# Patient Record
Sex: FEMALE | Race: WHITE | NOT HISPANIC OR LATINO | Employment: FULL TIME | URBAN - METROPOLITAN AREA
[De-identification: names, ages, dates, MRNs, and addresses within clinical notes are randomized per-mention and may not be internally consistent; named-entity substitution may affect disease eponyms.]

---
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
## 2018-12-22 ENCOUNTER — HOSPITAL ENCOUNTER (OUTPATIENT)
Dept: PREADMISSION TESTING | Facility: HOSPITAL | Age: 18
Discharge: HOME | End: 2018-12-22
Attending: PODIATRIST | Admitting: PODIATRIST

## 2018-12-22 LAB
ANION GAP SERPL CALC-SCNC: 15.1 MMOL/L (ref 10–20)
BASOPHILS # BLD AUTO: 0.1 10*3/UL (ref 0–0.2)
BASOPHILS NFR BLD AUTO: 1 % (ref 0–2)
BUN SERPL-MCNC: 12 MG/DL (ref 8–20)
BUN/CREAT SERPL: 15 (ref 5.4–26.2)
CALCIUM SERPL-MCNC: 9.5 MG/DL (ref 8.9–10.3)
CHLORIDE SERPL-SCNC: 103 MMOL/L (ref 101–111)
CONV CO2: 23 MMOL/L (ref 22–32)
CREAT UR-MCNC: 0.8 MG/DL (ref 0.4–1)
DIFFERENTIAL METHOD BLD: (no result)
EOSINOPHIL # BLD AUTO: 1.2 10*3/UL (ref 0–0.3)
EOSINOPHIL # BLD AUTO: 12 % (ref 0–3)
ERYTHROCYTE [DISTWIDTH] IN BLOOD BY AUTOMATED COUNT: 12.4 % (ref 11.5–14.5)
GLUCOSE SERPL-MCNC: 94 MG/DL (ref 65–99)
HCT VFR BLD AUTO: 41.2 % (ref 35–49)
HGB BLD-MCNC: 13.8 G/DL (ref 12–15)
INR PPP: 1
LYMPHOCYTES # BLD AUTO: 2.8 10*3/UL (ref 0.8–4.8)
LYMPHOCYTES NFR BLD AUTO: 27 % (ref 18–42)
MCH RBC QN AUTO: 29.9 PG (ref 26–32)
MCHC RBC AUTO-ENTMCNC: 33.6 G/DL (ref 32–36)
MCV RBC AUTO: 89.1 FL (ref 80–94)
MONOCYTES # BLD AUTO: 0.6 10*3/UL (ref 0.1–1.3)
MONOCYTES NFR BLD AUTO: 5 % (ref 2–11)
NEUTROPHILS # BLD AUTO: 5.6 10*3/UL (ref 2.3–8.6)
NEUTROPHILS NFR BLD AUTO: 55 % (ref 50–75)
NRBC BLD AUTO-RTO: 0 /100{WBCS}
NRBC/RBC NFR BLD MANUAL: 0 10*3/UL
PLATELET # BLD AUTO: 391 10*3/UL (ref 150–450)
PMV BLD AUTO: 7.7 FL (ref 7.4–10.4)
POTASSIUM SERPL-SCNC: 4.1 MMOL/L (ref 3.6–5.1)
PROTHROMBIN TIME: 10.7 SEC (ref 9.6–11.7)
RBC # BLD AUTO: 4.63 10*6/UL (ref 4–5.4)
SODIUM SERPL-SCNC: 137 MMOL/L (ref 136–144)
WBC # BLD AUTO: 10.3 10*3/UL (ref 4.5–11.5)

## 2018-12-24 LAB — 25(OH)D3 SERPL-MCNC: 14 NG/ML (ref 30–100)

## 2019-06-04 VITALS
SYSTOLIC BLOOD PRESSURE: 108 MMHG | DIASTOLIC BLOOD PRESSURE: 68 MMHG | OXYGEN SATURATION: 94 % | DIASTOLIC BLOOD PRESSURE: 73 MMHG | HEART RATE: 72 BPM | RESPIRATION RATE: 16 BRPM | HEART RATE: 122 BPM | RESPIRATION RATE: 24 BRPM | OXYGEN SATURATION: 99 % | WEIGHT: 174.5 LBS | WEIGHT: 169 LBS | SYSTOLIC BLOOD PRESSURE: 116 MMHG

## 2019-09-24 ENCOUNTER — LAB (OUTPATIENT)
Dept: FAMILY MEDICINE CLINIC | Facility: CLINIC | Age: 19
End: 2019-09-24

## 2019-09-24 ENCOUNTER — OFFICE VISIT (OUTPATIENT)
Dept: FAMILY MEDICINE CLINIC | Facility: CLINIC | Age: 19
End: 2019-09-24

## 2019-09-24 VITALS
BODY MASS INDEX: 36.07 KG/M2 | OXYGEN SATURATION: 97 % | TEMPERATURE: 98.5 F | RESPIRATION RATE: 18 BRPM | WEIGHT: 203.6 LBS | HEIGHT: 63 IN | SYSTOLIC BLOOD PRESSURE: 114 MMHG | DIASTOLIC BLOOD PRESSURE: 70 MMHG | HEART RATE: 56 BPM

## 2019-09-24 DIAGNOSIS — E55.9 VITAMIN D DEFICIENCY DISEASE: ICD-10-CM

## 2019-09-24 DIAGNOSIS — R53.83 OTHER FATIGUE: Primary | ICD-10-CM

## 2019-09-24 DIAGNOSIS — Z30.09 ENCOUNTER FOR EDUCATION ABOUT CONTRACEPTIVE USE: ICD-10-CM

## 2019-09-24 LAB
ALBUMIN SERPL-MCNC: 3.8 G/DL (ref 3.5–4.8)
ALBUMIN/GLOB SERPL: 1.3 G/DL (ref 1–1.7)
ALP SERPL-CCNC: 84 U/L (ref 32–91)
ALT SERPL W P-5'-P-CCNC: 14 U/L (ref 14–54)
ANION GAP SERPL CALCULATED.3IONS-SCNC: 12.8 MMOL/L (ref 5–15)
AST SERPL-CCNC: 19 U/L (ref 15–41)
BILIRUB SERPL-MCNC: 0.4 MG/DL (ref 0.3–1.2)
BUN BLD-MCNC: 11 MG/DL (ref 8–20)
BUN/CREAT SERPL: 13.8 (ref 5.4–26.2)
CALCIUM SPEC-SCNC: 9.3 MG/DL (ref 8.9–10.3)
CHLORIDE SERPL-SCNC: 106 MMOL/L (ref 101–111)
CO2 SERPL-SCNC: 25 MMOL/L (ref 22–32)
CREAT BLD-MCNC: 0.8 MG/DL (ref 0.4–1)
FOLATE SERPL-MCNC: 8.7 NG/ML (ref 5.9–24.8)
GFR SERPL CREATININE-BSD FRML MDRD: 91 ML/MIN/1.73
GLOBULIN UR ELPH-MCNC: 2.9 GM/DL (ref 2.5–3.8)
GLUCOSE BLD-MCNC: 73 MG/DL (ref 65–99)
POTASSIUM BLD-SCNC: 4.8 MMOL/L (ref 3.6–5.1)
PROT SERPL-MCNC: 6.7 G/DL (ref 6.1–7.9)
SODIUM BLD-SCNC: 139 MMOL/L (ref 136–144)
TSH SERPL DL<=0.05 MIU/L-ACNC: 0.78 UIU/ML (ref 0.34–5.6)
VIT B12 BLD-MCNC: 405 PG/ML (ref 180–914)

## 2019-09-24 PROCEDURE — 82746 ASSAY OF FOLIC ACID SERUM: CPT | Performed by: INTERNAL MEDICINE

## 2019-09-24 PROCEDURE — 84443 ASSAY THYROID STIM HORMONE: CPT | Performed by: INTERNAL MEDICINE

## 2019-09-24 PROCEDURE — 82607 VITAMIN B-12: CPT | Performed by: INTERNAL MEDICINE

## 2019-09-24 PROCEDURE — 99214 OFFICE O/P EST MOD 30 MIN: CPT | Performed by: INTERNAL MEDICINE

## 2019-09-24 PROCEDURE — 80053 COMPREHEN METABOLIC PANEL: CPT | Performed by: INTERNAL MEDICINE

## 2019-09-24 PROCEDURE — 82306 VITAMIN D 25 HYDROXY: CPT | Performed by: INTERNAL MEDICINE

## 2019-09-24 RX ORDER — ALBUTEROL SULFATE 2.5 MG/3ML
SOLUTION RESPIRATORY (INHALATION)
COMMUNITY
Start: 2011-10-11

## 2019-09-24 RX ORDER — LORATADINE 10 MG/1
CAPSULE, LIQUID FILLED ORAL
COMMUNITY
Start: 2013-03-22 | End: 2022-11-15

## 2019-09-24 RX ORDER — FLUOXETINE 10 MG/1
1 CAPSULE ORAL EVERY 24 HOURS
COMMUNITY
Start: 2017-08-30 | End: 2019-09-24

## 2019-09-24 RX ORDER — FLUOXETINE HYDROCHLORIDE 20 MG/1
1 CAPSULE ORAL EVERY 24 HOURS
COMMUNITY
Start: 2018-04-25 | End: 2020-03-16

## 2019-09-24 NOTE — PROGRESS NOTES
"Rooming Tab(CC,VS,Pt Hx,Fall Screen)  Chief Complaint   Patient presents with   • Contraception   • Fatigue       Subjective   Pt here for extreme fatigue-  Getting 9 hours of sleep but extremely fatigued when awakes and now falling asleep at red light- parents do not notice her snoring. Working 40 hours and going to school- 12 hours of class- this is last semester.    cycles  Very light  1 month-  1-2 days-   has implant and needs new one soon.   no migraines. No asthma issues.  Getting flu vaccines at work   having increased LAD in neck on and off-   I have reviewed and updated her medications, medical history and problem list during today's office visit.     Patient Care Team:  Gloria Lyons MD as PCP - General    Problem List Tab  Medications Tab  Synopsis Tab  Chart Review Tab  Care Everywhere Tab  Immunizations Tab  Patient History Tab    Social History     Tobacco Use   • Smoking status: Never Smoker   • Smokeless tobacco: Never Used   Substance Use Topics   • Alcohol use: Not on file       Review of Systems   Constitutional: Positive for activity change and fatigue. Negative for fever.   HENT: Negative for congestion.    Respiratory: Negative for apnea, cough and wheezing.    Cardiovascular: Negative for chest pain.   Gastrointestinal: Negative for abdominal distention.   Neurological: Negative for syncope.   Psychiatric/Behavioral: Negative for behavioral problems.       Objective     Rooming Tab(CC,VS,Pt Hx,Fall Screen)  /70 (BP Location: Right arm, Patient Position: Sitting, Cuff Size: Adult)   Pulse 56   Temp 98.5 °F (36.9 °C) (Oral)   Resp 18   Ht 160 cm (63\")   Wt 92.4 kg (203 lb 9.6 oz)   LMP  (LMP Unknown)   SpO2 97%   BMI 36.07 kg/m²     Body mass index is 36.07 kg/m².    Physical Exam   Constitutional: She is oriented to person, place, and time. She appears well-developed and well-nourished.   HENT:   Head: Normocephalic and atraumatic.   Right Ear: Tympanic membrane normal. "   Left Ear: Tympanic membrane normal.   Eyes: Pupils are equal, round, and reactive to light.   Neck: Normal range of motion. Neck supple.   Cardiovascular: Normal rate and regular rhythm.   No murmur heard.  Pulmonary/Chest: Effort normal and breath sounds normal.   Abdominal: Soft. Bowel sounds are normal. She exhibits no distension.   Neurological: She is oriented to person, place, and time.   Skin: Capillary refill takes less than 2 seconds.   Nursing note and vitals reviewed.       Statin Choice Calculator  Data Reviewed:               Lab Results   Component Value Date    BUN 11 09/24/2019    CREATININE 0.80 09/24/2019    EGFRIFNONA 91 09/24/2019     09/24/2019    K 4.8 09/24/2019     09/24/2019    CALCIUM 9.3 09/24/2019    ALBUMIN 3.80 09/24/2019    BILITOT 0.4 09/24/2019    ALKPHOS 84 09/24/2019    AST 19 09/24/2019    ALT 14 09/24/2019    TSH 0.780 09/24/2019    VBWL08KX 32.2 09/24/2019      Assessment/Plan   Order Review Tab  Health Maintenance Tab  Patient Plan/Order Tab  Diagnoses and all orders for this visit:    1. Other fatigue (Primary)  Comments:  check labs  Assessment & Plan:   Check labs   increase ecxercise     Orders:  -     Vitamin B12 & Folate  -     TSH  -     Comprehensive Metabolic Panel    2. Vitamin D deficiency disease  Assessment & Plan:  On OTC meds- will recheck     Orders:  -     Vitamin D 25 Hydroxy; Future    3. Encounter for education about contraceptive use      Wrapup Tab  Return in about 3 months (around 12/24/2019).       Wean prozavc from 30 to 20mg   will check back in 4 weeks and see how doing on lower amount     on allergy shots- no longer taking singulair

## 2019-09-25 LAB — 25(OH)D3 SERPL-MCNC: 32.2 NG/ML (ref 30–100)

## 2019-09-30 NOTE — PROGRESS NOTES
Please call the patient regarding her abnormal result. vD is a little better- but still needs to be on supplement

## 2019-10-08 ENCOUNTER — OFFICE VISIT (OUTPATIENT)
Dept: FAMILY MEDICINE CLINIC | Facility: CLINIC | Age: 19
End: 2019-10-08

## 2019-10-08 VITALS
RESPIRATION RATE: 16 BRPM | DIASTOLIC BLOOD PRESSURE: 58 MMHG | TEMPERATURE: 98.3 F | HEIGHT: 63 IN | BODY MASS INDEX: 36.36 KG/M2 | OXYGEN SATURATION: 92 % | WEIGHT: 205.2 LBS | HEART RATE: 83 BPM | SYSTOLIC BLOOD PRESSURE: 110 MMHG

## 2019-10-08 DIAGNOSIS — E55.9 VITAMIN D DEFICIENCY DISEASE: Primary | ICD-10-CM

## 2019-10-08 PROCEDURE — 99213 OFFICE O/P EST LOW 20 MIN: CPT | Performed by: INTERNAL MEDICINE

## 2019-10-08 NOTE — PROGRESS NOTES
"Rooming Tab(CC,VS,Pt Hx,Fall Screen)  Chief Complaint   Patient presents with   • Results     labs       Subjective    Pt here to discuss labs- Vd is low, but discussed all other labs were good  I have reviewed and updated her medications, medical history and problem list during today's office visit.     Patient Care Team:  Gloria Lyons MD as PCP - General    Problem List Tab  Medications Tab  Synopsis Tab  Chart Review Tab  Care Everywhere Tab  Immunizations Tab  Patient History Tab    Social History     Tobacco Use   • Smoking status: Never Smoker   • Smokeless tobacco: Never Used   Substance Use Topics   • Alcohol use: Not on file       Review of Systems   Constitutional: Negative for fatigue.   Respiratory: Negative for wheezing.    Cardiovascular: Negative for chest pain.   Neurological: Negative for weakness.       Objective     Rooming Tab(CC,VS,Pt Hx,Fall Screen)  /58 (BP Location: Right arm, Patient Position: Sitting, Cuff Size: Adult)   Pulse 83   Temp 98.3 °F (36.8 °C) (Oral)   Resp 16   Ht 160 cm (63\")   Wt 93.1 kg (205 lb 3.2 oz)   LMP  (LMP Unknown)   SpO2 92%   BMI 36.35 kg/m²     Body mass index is 36.35 kg/m².    Physical Exam   Constitutional: She is oriented to person, place, and time. She appears well-developed and well-nourished.   HENT:   Head: Normocephalic and atraumatic.   Right Ear: Tympanic membrane normal.   Left Ear: Tympanic membrane normal.   Eyes: Pupils are equal, round, and reactive to light.   Neck: Normal range of motion. Neck supple.   Cardiovascular: Normal rate and regular rhythm.   No murmur heard.  Pulmonary/Chest: Effort normal and breath sounds normal.   Abdominal: Soft. Bowel sounds are normal. She exhibits no distension.   Neurological: She is oriented to person, place, and time.   Skin: Capillary refill takes less than 2 seconds.   Nursing note and vitals reviewed.       Statin Choice Calculator  Data Reviewed:               Lab Results   Component " Value Date    BUN 11 09/24/2019    CREATININE 0.80 09/24/2019    EGFRIFNONA 91 09/24/2019     09/24/2019    K 4.8 09/24/2019     09/24/2019    CALCIUM 9.3 09/24/2019    ALBUMIN 3.80 09/24/2019    BILITOT 0.4 09/24/2019    ALKPHOS 84 09/24/2019    AST 19 09/24/2019    ALT 14 09/24/2019    TSH 0.780 09/24/2019    AIJO14PW 32.2 09/24/2019      Assessment/Plan   Order Review Tab  Health Maintenance Tab  Patient Plan/Order Tab  Diagnoses and all orders for this visit:    1. Vitamin D deficiency disease (Primary)  Comments:  continue with vD suppleemnt as greatly increased but still low         Wrapup Tab  Return in about 1 year (around 10/8/2020).         They were informed of the diagnosis and treatment plan and directed to f/u for any further problems or concerns.

## 2019-10-24 RX ORDER — FLUOXETINE 10 MG/1
CAPSULE ORAL
Qty: 90 CAPSULE | Refills: 0 | Status: SHIPPED | OUTPATIENT
Start: 2019-10-24 | End: 2022-11-15

## 2019-11-13 ENCOUNTER — LAB (OUTPATIENT)
Dept: LAB | Facility: HOSPITAL | Age: 19
End: 2019-11-13

## 2019-11-13 ENCOUNTER — TRANSCRIBE ORDERS (OUTPATIENT)
Dept: ADMINISTRATIVE | Facility: HOSPITAL | Age: 19
End: 2019-11-13

## 2019-11-13 DIAGNOSIS — Z01.818 PREOP TESTING: Primary | ICD-10-CM

## 2019-11-13 DIAGNOSIS — Z01.818 PREOP TESTING: ICD-10-CM

## 2019-11-13 LAB
ANION GAP SERPL CALCULATED.3IONS-SCNC: 10 MMOL/L (ref 5–15)
B-HCG UR QL: NEGATIVE
BASOPHILS # BLD AUTO: 0.05 10*3/MM3 (ref 0–0.2)
BASOPHILS NFR BLD AUTO: 0.6 % (ref 0–1.5)
BUN BLD-MCNC: 16 MG/DL (ref 6–20)
BUN/CREAT SERPL: 23.5 (ref 7–25)
CALCIUM SPEC-SCNC: 9 MG/DL (ref 8.6–10.5)
CHLORIDE SERPL-SCNC: 105 MMOL/L (ref 98–107)
CO2 SERPL-SCNC: 26 MMOL/L (ref 22–29)
CREAT BLD-MCNC: 0.68 MG/DL (ref 0.57–1)
DEPRECATED RDW RBC AUTO: 38.9 FL (ref 37–54)
EOSINOPHIL # BLD AUTO: 0.47 10*3/MM3 (ref 0–0.4)
EOSINOPHIL NFR BLD AUTO: 6 % (ref 0.3–6.2)
ERYTHROCYTE [DISTWIDTH] IN BLOOD BY AUTOMATED COUNT: 12.4 % (ref 12.3–15.4)
GFR SERPL CREATININE-BSD FRML MDRD: 109 ML/MIN/1.73
GLUCOSE BLD-MCNC: 103 MG/DL (ref 65–99)
HCT VFR BLD AUTO: 40.4 % (ref 34–46.6)
HGB BLD-MCNC: 13.7 G/DL (ref 12–15.9)
IMM GRANULOCYTES # BLD AUTO: 0.01 10*3/MM3 (ref 0–0.05)
IMM GRANULOCYTES NFR BLD AUTO: 0.1 % (ref 0–0.5)
LYMPHOCYTES # BLD AUTO: 2.4 10*3/MM3 (ref 0.7–3.1)
LYMPHOCYTES NFR BLD AUTO: 30.5 % (ref 19.6–45.3)
MCH RBC QN AUTO: 29.4 PG (ref 26.6–33)
MCHC RBC AUTO-ENTMCNC: 33.9 G/DL (ref 31.5–35.7)
MCV RBC AUTO: 86.7 FL (ref 79–97)
MONOCYTES # BLD AUTO: 0.36 10*3/MM3 (ref 0.1–0.9)
MONOCYTES NFR BLD AUTO: 4.6 % (ref 5–12)
NEUTROPHILS # BLD AUTO: 4.57 10*3/MM3 (ref 1.7–7)
NEUTROPHILS NFR BLD AUTO: 58.2 % (ref 42.7–76)
NRBC BLD AUTO-RTO: 0 /100 WBC (ref 0–0.2)
PLATELET # BLD AUTO: 387 10*3/MM3 (ref 140–450)
PMV BLD AUTO: 10.2 FL (ref 6–12)
POTASSIUM BLD-SCNC: 3.9 MMOL/L (ref 3.5–5.2)
RBC # BLD AUTO: 4.66 10*6/MM3 (ref 3.77–5.28)
SODIUM BLD-SCNC: 141 MMOL/L (ref 136–145)
WBC NRBC COR # BLD: 7.86 10*3/MM3 (ref 3.4–10.8)

## 2019-11-13 PROCEDURE — 85025 COMPLETE CBC W/AUTO DIFF WBC: CPT

## 2019-11-13 PROCEDURE — 81025 URINE PREGNANCY TEST: CPT

## 2019-11-13 PROCEDURE — 80048 BASIC METABOLIC PNL TOTAL CA: CPT

## 2019-11-13 PROCEDURE — 36415 COLL VENOUS BLD VENIPUNCTURE: CPT

## 2020-03-16 RX ORDER — FLUOXETINE HYDROCHLORIDE 20 MG/1
CAPSULE ORAL
Qty: 90 CAPSULE | Refills: 1 | Status: SHIPPED | OUTPATIENT
Start: 2020-03-16 | End: 2020-03-17

## 2020-03-17 RX ORDER — FLUOXETINE HYDROCHLORIDE 20 MG/1
CAPSULE ORAL
Qty: 90 CAPSULE | Refills: 1 | Status: SHIPPED | OUTPATIENT
Start: 2020-03-17 | End: 2020-12-02

## 2020-12-02 RX ORDER — FLUOXETINE HYDROCHLORIDE 20 MG/1
CAPSULE ORAL
Qty: 90 CAPSULE | Refills: 2 | Status: SHIPPED | OUTPATIENT
Start: 2020-12-02 | End: 2021-12-10

## 2021-12-10 RX ORDER — FLUOXETINE HYDROCHLORIDE 20 MG/1
CAPSULE ORAL
Qty: 90 CAPSULE | Refills: 2 | Status: SHIPPED | OUTPATIENT
Start: 2021-12-10 | End: 2022-11-07 | Stop reason: SDUPTHER

## 2022-11-01 RX ORDER — FLUOXETINE 10 MG/1
10 CAPSULE ORAL DAILY
Qty: 90 CAPSULE | Refills: 0 | OUTPATIENT
Start: 2022-11-01

## 2022-11-01 NOTE — TELEPHONE ENCOUNTER
Caller: ALFONSOPRERNA    Relationship: Mother    Best call back number: [phone]     Requested Prescriptions:   Requested Prescriptions     Pending Prescriptions Disp Refills   • FLUoxetine (PROzac) 10 MG capsule 90 capsule 0     Sig: Take 1 capsule by mouth Daily.        Pharmacy where request should be sent: [facility] [id number] [place], [place] - [address] - [phone]  - [phone]     Does the patient have less than a 3 day supply:  [x] Yes  [] No    Flavia Mejia Rep   11/01/22 15:58 EDT

## 2022-11-07 RX ORDER — FLUOXETINE HYDROCHLORIDE 20 MG/1
20 CAPSULE ORAL DAILY
Qty: 10 CAPSULE | Refills: 0 | Status: SHIPPED | OUTPATIENT
Start: 2022-11-07 | End: 2022-11-15 | Stop reason: SDUPTHER

## 2022-11-07 NOTE — TELEPHONE ENCOUNTER
DELETE AFTER REVIEWING: Send the encounter HIGH priority, If patient has less than a 3 day supply. If the patient will run out of medication over the weekend add that information to the additional details line. Send this encounter to the clinical pool.    Caller: Wendi Bailey    Relationship: Self    Best call back number: [phone]    Requested Prescriptions:   Requested Prescriptions     Pending Prescriptions Disp Refills   • FLUoxetine (PROzac) 20 MG capsule 90 capsule 2     Sig: Take 1 capsule by mouth Daily.        Pharmacy where request should be sent: [facility] [id number] [facility] [address] [facility] - [phone]  - [phone] FX     Additional details provided by patient: PATIENT HAS AN APPOINTMENT NEXT TUESDAY AND IS WONDERING IF SHE CAN GET HER MEDICATION FILLED UNTIL THEN SHES COMPLETLEY OUT    Does the patient have less than a 3 day supply:  [x] Yes  [] No    Flavia Rouse   11/07/22 11:46 EST

## 2022-11-15 ENCOUNTER — OFFICE VISIT (OUTPATIENT)
Dept: FAMILY MEDICINE CLINIC | Facility: CLINIC | Age: 22
End: 2022-11-15

## 2022-11-15 VITALS
DIASTOLIC BLOOD PRESSURE: 68 MMHG | BODY MASS INDEX: 26.75 KG/M2 | HEIGHT: 63 IN | SYSTOLIC BLOOD PRESSURE: 118 MMHG | TEMPERATURE: 97.7 F | WEIGHT: 151 LBS | OXYGEN SATURATION: 100 % | RESPIRATION RATE: 16 BRPM | HEART RATE: 61 BPM

## 2022-11-15 DIAGNOSIS — J45.40 MODERATE PERSISTENT ASTHMA WITHOUT COMPLICATION: ICD-10-CM

## 2022-11-15 DIAGNOSIS — Z23 NEED FOR INFLUENZA VACCINATION: Primary | ICD-10-CM

## 2022-11-15 DIAGNOSIS — E55.9 VITAMIN D DEFICIENCY DISEASE: ICD-10-CM

## 2022-11-15 DIAGNOSIS — F41.9 ANXIETY: ICD-10-CM

## 2022-11-15 PROCEDURE — 90686 IIV4 VACC NO PRSV 0.5 ML IM: CPT | Performed by: INTERNAL MEDICINE

## 2022-11-15 PROCEDURE — 90715 TDAP VACCINE 7 YRS/> IM: CPT | Performed by: INTERNAL MEDICINE

## 2022-11-15 PROCEDURE — 90471 IMMUNIZATION ADMIN: CPT | Performed by: INTERNAL MEDICINE

## 2022-11-15 PROCEDURE — 90472 IMMUNIZATION ADMIN EACH ADD: CPT | Performed by: INTERNAL MEDICINE

## 2022-11-15 PROCEDURE — 99203 OFFICE O/P NEW LOW 30 MIN: CPT | Performed by: INTERNAL MEDICINE

## 2022-11-15 RX ORDER — FLUOXETINE HYDROCHLORIDE 20 MG/1
20 CAPSULE ORAL DAILY
Qty: 90 CAPSULE | Refills: 3 | Status: SHIPPED | OUTPATIENT
Start: 2022-11-15

## 2022-11-15 RX ORDER — ALBUTEROL SULFATE 90 UG/1
2 AEROSOL, METERED RESPIRATORY (INHALATION) EVERY 4 HOURS PRN
COMMUNITY

## 2022-11-15 NOTE — PROGRESS NOTES
"Rooming Tab(CC,VS,Pt Hx,Fall Screen)  Chief Complaint   Patient presents with   • Anxiety       Subjective      @ name @ is a @ age @ @ sex @ who presents for follow-up and re establishment.    The patient states she is doing well and denies any significant pain or new complaints today.  It has been > than 3 years since last seen in the office.    Anxiety  The patient states that she is doing well on fluoxetine 20 mg daily. She states that she has been running a lot and has to use her inhaler before she runs. She states that she has lost 54 pounds since her last visit. She states that she does not eat fried foods anymore.    Acne  The patient states that she gets acne once a month. She states that it is usually on her cheek line or her chin.    Health maintenance  The patient states she would like to receive the tetanus and influenza vaccine today.  She sees her gyn regularly.    I have reviewed and updated her medications, medical history and problem list during today's office visit.     Patient Care Team:  Gloria Lyons MD as PCP - General    Problem List Tab  Medications Tab  Synopsis Tab  Chart Review Tab  Care Everywhere Tab  Immunizations Tab  Patient History Tab    Social History     Tobacco Use   • Smoking status: Never   • Smokeless tobacco: Never   Substance Use Topics   • Alcohol use: Yes     Alcohol/week: 1.0 standard drink     Types: 1 Cans of beer per week       Review of Systems    Objective     Rooming Tab(CC,VS,Pt Hx,Fall Screen)  /68   Pulse 61   Temp 97.7 °F (36.5 °C)   Resp 16   Ht 160 cm (63\")   Wt 68.5 kg (151 lb)   SpO2 100%   BMI 26.75 kg/m²     Body mass index is 26.75 kg/m².    Physical Exam  Vitals and nursing note reviewed.   Constitutional:       Appearance: Normal appearance. She is well-developed.   HENT:      Head: Normocephalic and atraumatic.      Nose: No rhinorrhea.      Mouth/Throat:      Pharynx: No posterior oropharyngeal erythema.   Eyes:      Pupils: " Pupils are equal, round, and reactive to light.   Cardiovascular:      Rate and Rhythm: Normal rate and regular rhythm.      Pulses: Normal pulses.      Heart sounds: Normal heart sounds. No murmur heard.  Pulmonary:      Effort: Pulmonary effort is normal.      Breath sounds: Normal breath sounds.   Musculoskeletal:         General: No tenderness.      Cervical back: Normal range of motion and neck supple.   Skin:     Capillary Refill: Capillary refill takes less than 2 seconds.   Neurological:      Mental Status: She is alert and oriented to person, place, and time.   Psychiatric:         Mood and Affect: Mood normal.         Behavior: Behavior normal.          Statin Choice Calculator  Data Reviewed:         The data below has been reviewed by Gloria Lyons MD on 11/15/2022.          Assessment & Plan   Order Review Tab  Health Maintenance Tab  Patient Plan/Order Tab  Diagnoses and all orders for this visit:    1. Need for influenza vaccination (Primary)  -     FluLaval/Fluarix/Fluzone >6 Months    2. Vitamin D deficiency disease    3. Anxiety  Comments:  doign well with current dose will refill on same    4. Moderate persistent asthma without complication  Comments:    doing well with rescue before sports    Other orders  -     FLUoxetine (PROzac) 20 MG capsule; Take 1 capsule by mouth Daily.  Dispense: 90 capsule; Refill: 3    1. Anxiety  - She will continue fluoxetine 20 mg daily.    2. Asthma  - She will continue to use her inhaler as needed.    3.Health maintenance  - She will receive tetanus and influenza vaccine today.    Wrapup Tab  Return if symptoms worsen or fail to improve.       They were informed of the diagnosis and treatment plan and directed to f/u for any further problems or concerns.              Answers for HPI/ROS submitted by the patient on 11/14/2022  Please describe your symptoms.: None  Have you had these symptoms before?: No  How long have you been having these symptoms?: 1-4  days  What is the primary reason for your visit?: Other    Transcribed from ambient dictation for Gloria Lyons MD by Mirella Young.  11/15/22   09:15 EST    Patient or patient representative verbalized consent to the visit recording.  I have personally performed the services described in this document as transcribed by the above individual, and it is both accurate and complete.  Gloria Lyons MD  11/15/2022  14:25 EST

## 2022-11-18 ENCOUNTER — HOSPITAL ENCOUNTER (EMERGENCY)
Facility: HOSPITAL | Age: 22
Discharge: HOME | End: 2022-11-18
Attending: EMERGENCY MEDICINE | Admitting: EMERGENCY MEDICINE

## 2022-11-18 ENCOUNTER — APPOINTMENT (OUTPATIENT)
Dept: CT IMAGING | Facility: HOSPITAL | Age: 22
End: 2022-11-18

## 2022-11-18 VITALS
TEMPERATURE: 98 F | HEART RATE: 56 BPM | RESPIRATION RATE: 16 BRPM | BODY MASS INDEX: 26.6 KG/M2 | HEIGHT: 63 IN | DIASTOLIC BLOOD PRESSURE: 66 MMHG | WEIGHT: 150.13 LBS | OXYGEN SATURATION: 100 % | SYSTOLIC BLOOD PRESSURE: 96 MMHG

## 2022-11-18 DIAGNOSIS — G44.311 INTRACTABLE ACUTE POST-TRAUMATIC HEADACHE: Primary | ICD-10-CM

## 2022-11-18 DIAGNOSIS — S06.0X0A CONCUSSION WITHOUT LOSS OF CONSCIOUSNESS, INITIAL ENCOUNTER: ICD-10-CM

## 2022-11-18 PROCEDURE — 70450 CT HEAD/BRAIN W/O DYE: CPT

## 2022-11-18 PROCEDURE — 99282 EMERGENCY DEPT VISIT SF MDM: CPT

## 2022-11-19 NOTE — DISCHARGE INSTRUCTIONS
Get head injury instructions; keep head elevated, apply cool compresses as tolerated; activity as tolerated, avoid large meals or excessive fluid intake for the next 12 hours; follow up with your primary care physician in office for continued evaluation; Tylenol as needed for pain; return to ED for any new concerns or changes including behavioral cognition changes, imbalance, vomiting, any increased swelling, inability of eyes to focus or other changes.    Follow-up with your primary care provider in 3-5 days.  If you do not have a primary care provider call [phone] for help in finding one, or you may follow up with [facility] at [phone].

## 2022-11-19 NOTE — ED PROVIDER NOTES
Subjective    Provider in Triage Note  Patient is a 22-year-old female presents to the ED with complaints of posttraumatic headache.  Patient states she got hit in the head today with a soccer ball.  She states it hit her in the back of the head.  Patient denies LOC at the time of the incident but does report decreased concentration since.  She reports pain as moderate severity.  She reports some associated nausea without vomiting.  She denies any lightheadedness or dizziness.  No changes in vision.  No other injury from the incident.  States the pain is along the back of her head and behind her eyes.  States her pain is nonradiating from her head.      History of Present Illness  See PET note.        Review of Systems   Constitutional: Negative.    HENT: Negative for congestion, dental problem, nosebleeds, trouble swallowing and voice change.    Eyes: Negative.    Respiratory: Negative.    Cardiovascular: Negative.    Gastrointestinal: Positive for nausea. Negative for abdominal distention, abdominal pain, constipation, diarrhea and vomiting.   Genitourinary: Negative.    Musculoskeletal: Negative.    Skin: Negative.    Neurological: Positive for headaches. Negative for dizziness, tremors, seizures, syncope, facial asymmetry, speech difficulty, weakness, light-headedness and numbness.   Hematological: Negative.    Psychiatric/Behavioral: Negative for confusion.       Past Medical History:   Diagnosis Date   • Allergic    • Anxiety    • Asthma        Allergies   Allergen Reactions   • Codeine Rash       Past Surgical History:   Procedure Laterality Date   • FRACTURE SURGERY         Family History   Problem Relation Age of Onset   • Anxiety disorder Mother    • Pulmonary embolism Father    • Hypertension Father        Social History     Socioeconomic History   • Marital status: Single   Tobacco Use   • Smoking status: Never   • Smokeless tobacco: Never   Vaping Use   • Vaping Use: Never used   Substance and Sexual  Activity   • Alcohol use: Yes     Alcohol/week: 1.0 standard drink     Types: 1 Cans of beer per week   • Drug use: Never   • Sexual activity: Yes     Partners: Male     Birth control/protection: I.U.D.           Objective   Physical Exam  Vitals and nursing note reviewed.   Constitutional:       General: She is not in acute distress.     Appearance: Normal appearance. She is well-developed. She is not ill-appearing, toxic-appearing or diaphoretic.   HENT:      Head: Normocephalic. No raccoon eyes, Ward's sign, contusion or laceration.      Mouth/Throat:      Mouth: Mucous membranes are moist.      Pharynx: Oropharynx is clear.   Eyes:      General: No scleral icterus.     Extraocular Movements: Extraocular movements intact.      Pupils: Pupils are equal, round, and reactive to light.   Neck:      Trachea: Trachea and phonation normal.   Cardiovascular:      Rate and Rhythm: Normal rate and regular rhythm.      Pulses: Normal pulses.      Heart sounds: No murmur heard.    No friction rub. No gallop.   Pulmonary:      Effort: Pulmonary effort is normal. No respiratory distress.      Breath sounds: Normal breath sounds. No stridor. No wheezing, rhonchi or rales.   Chest:      Chest wall: No tenderness.   Musculoskeletal:      Cervical back: Normal range of motion. No spinous process tenderness or muscular tenderness.   Skin:     General: Skin is warm.      Capillary Refill: Capillary refill takes less than 2 seconds.      Coloration: Skin is not cyanotic, jaundiced or pale.      Findings: No rash.   Neurological:      General: No focal deficit present.      Mental Status: She is alert and oriented to person, place, and time.      GCS: GCS eye subscore is 4. GCS verbal subscore is 5. GCS motor subscore is 6.      Comments: Normal and equal sensation and strength throughout moving all extremities freely   Psychiatric:         Mood and Affect: Mood normal.         Behavior: Behavior normal.         Procedures      "      ED Course    BP 96/66   Pulse 56   Temp 98 °F (36.7 °C)   Resp 16   Ht 160 cm (63\")   Wt 68.1 kg (150 lb 2.1 oz)   SpO2 100%   BMI 26.59 kg/m²   Medications - No data to display  Labs Reviewed - No data to display    CT Head Without Contrast    Result Date: 11/18/2022  No acute intracranial abnormality.  Electronically Signed By-Wilberto Booker On:11/18/2022 10:29 PM This report was finalized on [id number] by  Wilberto Booker, .                                           MDM  Number of Diagnoses or Management Options  Concussion without loss of consciousness, initial encounter  Intractable acute post-traumatic headache  Diagnosis management comments: Chart Review:  Comorbidity: As per past medical history  Differentials: Concussion, skull fracture, brain bleed      ;this list is not all inclusive and does not constitute the entirety of considered causes  Imaging: Was interpreted by physician and reviewed by myself:  CT Head Without Contrast   Final Result    No acute intracranial abnormality.         Electronically Signed By-Wilberot Booker On:11/18/2022 10:29 PM    This report was finalized on [id number] by  Wilberto Booker, .     Disposition/Treatment:  Appropriate PPE was worn during exam and throughout all encounters with the patient.  While in the ED patient was afebrile and appeared nontoxic showed no acute cranial focal neural deficits.  She was ambulatory with an upright steady gait.  She presented to the ED with complaints of posttraumatic headache.  CT head showed no acute intracranial abnormalities.  Patient symptoms likely secondary to concussion.  Patient was given head injury instructions along with signs and symptoms to return to the ED.  She was in agreement with plan.  All questions were answered at bedside.  Patient was ambulatory through steady gait at time of departure.       Amount and/or Complexity of Data Reviewed  Tests in the radiology section of CPT®: " reviewed        Final diagnoses:   Intractable acute post-traumatic headache   Concussion without loss of consciousness, initial encounter       ED Disposition  ED Disposition     ED Disposition   Discharge    Condition   Stable    Comment   --             Gloria Lyons MD  [address]  [facility] [address] [phone]    Schedule an appointment as soon as possible for a visit in 3 days      [facility] EMERGENCY DEPARTMENT  [facility] [facility] [address] [phone]  Go to   If symptoms worsen         Medication List      No changes were made to your prescriptions during this visit.          Brittany Miller PA  11/18/22 [id number]

## 2023-09-07 ENCOUNTER — E-VISIT (OUTPATIENT)
Dept: FAMILY MEDICINE CLINIC | Facility: TELEHEALTH | Age: 23
End: 2023-09-07

## 2023-09-07 DIAGNOSIS — F32.A DEPRESSION, UNSPECIFIED DEPRESSION TYPE: Primary | ICD-10-CM

## 2023-09-07 NOTE — EXTERNAL PATIENT INSTRUCTIONS
[image:  data:image/svg+xml;base64,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]   Note   Hi Wendi, I have ordered a behavorial Health consult for you and advised them that you are in need of medications. They should be reaching out to you in the next week or less. If you should have any thoughts of hurting yourself or others please call #[phone].   [image:  data:image/svg+xml;base64,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 ZQHgSH80XGFoPYP5WmF2KpxdBLXhDgM6MAAjQZFuVcSjZZIiTuM7Rj18H7JvsAu+UajoTVjuFECgCHcpCWOpi5LwUxZxtCGfqc9rj8Yky7SjXMC0td6rsBZcv2GblDncrEIwRCT6Qt4zEQ82AJU8SODiLmTyHvntHG31RNSiFYFjOhStGI5yCTAuCHBvRp94U0TvwYz+JgdvQEtxNALwXAzyBQZhYUfsRwOudAFpii7vi6Agu3HvPOK8ca5wxJeuBDbkaGZbEMK7rc3zCT61pLAnZGRwHA5vYWJfUCQ7RdJiYIWoLDJvBY4jHxAlTF65RNIxOXGuBSJcJn02HYL0ITU8RPF2LwUnMTemLlTrVICmMG8jUzGvNI99HCJyQiG1MK77ETZtJZE2FjEeZWTtZhE7Dw34ExMaVE07AbozDK7dOQXbIdrkRV12PdLeBjxcFYlkLZFgQz75ANG2WFZqJEQ2uuZ+MA6gAWXiAgd4Z3R6Uw2=]   Diagnosis   Depression   My name is SONIA Sullivan. I'm a healthcare provider at [facility]. I've reviewed your interview, and I see that you have some common symptoms of depression. I'm glad you reached out.   Depression is the most common mental health condition worldwide. In the [place], about 1 in 5 people will experience clinical depression in their lifetime. Fortunately, effective treatments are available. The sooner you start treatment, the better it works.   Treatment for depression can include counseling, coaching, consultations, antidepressant medications, or various digital tools. In creating your treatment plan, I've considered your symptoms, current situation, medical history, and previous treatments, if any.    Please follow up with your provider in a few weeks. They'll check how you're doing and adjust your treatment plan if necessary.   In addition to your prescribed treatment, there are things you can do to help yourself feel better. Depression can lead you to avoid doing tasks, activities, and being with others. Taking action can help break the cycle of avoidance. Even small actions and lifestyle changes can make a big difference. Try some of the suggestions in the Other treatment section below.   [image:  data:image/svg+xml;base64,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]   Orders and referrals   I've included a referral for therapy in your treatment plan. Someone will contact you to schedule an appointment for counseling or therapy.   [image:  data:image/svg+xml;base64,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]   About your diagnosis   Depression is different from ordinary sadness. When you're sad or going through normal grief, the feelings may come and go, and then fade over time. Depression causes long-standing symptoms that affect your ability to go about your daily life.   It's a health condition that affects how you think and function, and can even affect your self-esteem. Sometimes depression  can also cause physical symptoms such as headaches and stomach pains.   Common symptoms of depression include:    Feeling sad, hopeless, discouraged, or down    Loss of interest or pleasure in previously enjoyable activities    Appetite or weight changes    Sleep disturbances: sleeping too much or too little    Either restlessness or sluggishness    Loss of energy    Excessive guilt    Feelings of worthlessness    Difficulty concentrating    Recurrent thoughts of death or suicide   [image:  data:image/svg+xml;base64,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]   What to  expect   Counseling and talk therapy   Counseling or therapy teaches you new coping skills and more adaptive ways of thinking about problems. These tools can help you make positive changes. The benefits of counseling often last long after treatment sessions have stopped.   Many people with mild symptoms of depression don't need medication, and can be treated with counseling, coaching, or other types of care management.   [image: data:image/svg+xml;base64,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]   When to seek care   If you feel like harming yourself or others, call 911 right away.   The [employer] is also available. You can call [employer]   to speak with a counselor at the lifeline, or you can connect with one using their online chat  .   Call us at [phone]   with any sudden or unexpected symptoms.    Worsening depression symptoms    New or worsening anxiety symptoms    Feeling extremely agitated or restless    Panic attacks    Worsening insomnia    New or worsening irritability    Inappropriate aggression, anger, or violence    Dangerous impulses    Extreme increase in activity or talking     Other unusual changes in behavior, mood, thoughts, or feeling   [image: data:image/svg+xml;base64,XDE2ShV7pScjzc0zfYS3aZlqC5q1bg62Wa5kehtqEgBcJX7evikpFAKkehZju195GkVzNSFpI2fav6L5VpOeKSQ7Hn8yuwVvs8IiUBYgPHkkTWKdU61bWOV6jc4srWymyzRrdM0uVOPakA7qMZBrc7mnfEf4FsN9KxNpUEoteVF2CzW3DdV3aNB1Oc37PSQzDVDzHqVvDgMsJQmazGemqWGqAL6yxUFra0SmayJkJMGkfHJkqYrtSDIaGOI8gjLdZl0XPPA3rW1jtYrpyrFyDY7mX0xhh2EdVQCxNWGpDLCcFA6hhvxlKGOfWDB4lo2aYK85aREfCWZgkN5gnjBnXOL2JzZjGUVxMRQxOEJhYH83AXTpWRBfWEHbTXK+BU8zi4s8A04oFpkWYEP6lB7wfTnpgkLzMU4jQ6bdf7FgMeJlSJTqR7yrh4O4UlFiCYS4Nj2xcxVxp6Hiz8dxRSSiYG7iXcVpg2xqzXU2MaShNxUyZIYaNuDbWBF3XDT9KtW1PKP6Tx41HZD1JFB+EK6hn1u0N07mMfiKAXV1qGE5xHSzSB3uB8mhv7HevOmsnTouMlIdjTMzqn6co8Uug1PxTOG8op7irPGqf7TxeGfgrZTnOYS2Ez78SA18MSK8XhgnMGHvYR8oEoByKn16WOIiOk41PbSvTT44IK15VcLqFC88EfDpXx1vUU36LFEcNTAxRWLpFICsHZ1nOlUkTo85UTOkB4nwVQ0oNGU0vGMrOkMDVEThNJVaUV50bwE+LZ6eEOVmWmw3S2T8Pi3=]   Other treatment   The tips below may help you feel better while you start your treatment plan:   Self-care    Depression can make self-care hard, but taking action can help you get better. So start where you are and set small goals. These can be simple: get out of bed, take a shower, get dressed, prepare a meal.    Make a list of activities that usually improve your mood. When you're feeling down, try doing one of those activities, even for a few minutes.    Be kind to yourself. Don't get down on yourself if you don't reach a goal. Be willing to try again.    Try to eat on a regular schedule. Blood sugar levels can affect mood.   Avoid alcohol - Alcohol is a depressant and may make you feel worse.   Exercise    Physical exercise has an especially positive effect on mood. If you're able to, try walking 30 minutes a day, 3 to 5 times a week. If that sounds like too much, challenge yourself to start walking for just 10 minutes a day. If walking is not for you, find another activity. Any kind of physical activity helps. The best exercise is the kind  "you enjoy and will actually do.   Improve your sleep   Getting better sleep is one of the best things you can do to improve your symptoms.    Caffeine, tobacco, and alcohol can cause interrupted sleep. Cutting down or quitting these can improve the quality of your sleep. If you can't quit caffeine completely, try avoiding it later in the day.    Set a regular bedtime, and allow a period of time to \"unwind\" before going to sleep.    Wake up at the same time every day.    Turn off or put away all electronic devices an hour before going to sleep.    Avoid reading, watching TV, or using electronic devices in bed.    As much as possible, keep your bedroom dark, cool, and quiet.    If you're struggling to sleep, don't stay in bed. Get up and go to a quiet spot. Read or do relaxation exercises. Then go back to bed and try again.   Try mindfulness exercises    If your mind races, focus on your body instead. Breathe in slowly through your nose and out through your mouth.    Some people find that meditation helps with mood symptoms. If you want to try meditation but don't know how, mobile apps can get you started.   Use your creativity    When you have depression, you can often spend too much time thinking. Making something with your hands can use your thoughts in a positive way and bring some relief. It also helps you move from inaction to action. Activities like writing in a journal, gardening, woodworking, cooking, or doing a craft can help focus your mind.   Connect with others    If you can't meet in person, send a short text or email to someone just to keep in touch.    If you use social media, notice how it makes you feel. If certain topics or people have a negative effect on your mood, unfollow them. Limit the time you spend on social media. Active participation can be better than passive scrolling through a feed.    If you're up to it, try volunteering. Or just do something kind for someone. This can lift your mood " as well as theirs.   [image: data:image/svg+xml;base64,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]   Your provider   Your diagnosis was provided by SONIA Sullivan, a member of your trusted care team at [facility].   If you have any questions, call us at [phone]  .

## 2023-09-07 NOTE — E-VISIT TREATED
Chief Complaint: Anxiety, Depression, Stress   Patient introduction   Patient is 23-year-old female presenting with mood symptoms. Patient has had current symptoms for less than a year. Has had recent unusual stress relating to personal relationships, home situation, and work.   Patient-submitted comments explaining reason for visit: I would like to maybe increase my fluoxetine dosage and get something to take at night to help with sleep and anxiety, kolonipin really helped when I was you see and was taking it at night. .   Patient did not request an excuse note.   Depression screening   PHQ-9. Response options are: Not at all (0), On several days (1), More than half the days (2), or Nearly every day (3).   Over the past 2 weeks, patient has been bothered:    (1) On several days by having little interest or pleasure in doing things    (1) On several days by depressed mood    (3) Nearly every day by sleep disturbance    (2) On more than half the days by fatigue or lethargy    (2) On more than half the days by change in appetite    (1) On several days by feelings of worthlessness or excessive guilt    (0) Not at all by poor concentration    (0) Not at all by observable restlessness or slowness in movement    (0) Not at all by thoughts of hurting themselves or that they would be better off dead   The above problems have made it somewhat difficult to work, function at home, or get along with other people.   Score: 10. Interpretation: 0 to 4: None to minimal. 5 to 9: Mild depression. 10 to 14: Major Depressive Disorder, Mild. 15 to 19: Major Depressive Disorder, Moderately Severe. 20 to 27: Major Depressive Disorder, Severe.   Anxiety screening   BIJAN-7. Response options are: Not at all (0), On several days (1), More than half the days (2), or Nearly every day (3)   Over the past 2 weeks, patient has been bothered:    (3) Nearly every day by feeling nervous, anxious, or on edge    (3) Nearly every day by not being able  to stop or control worrying    (3) Nearly every day by worrying too much about different things    (3) Nearly every day by having trouble relaxing    (1) On several days by being so restless that it is hard to sit still    (1) On several days by becoming easily annoyed or irritable    (0) Not at all by feeling afraid, as if something awful might happen   The above problems have made it somewhat difficult to work, function at home, or get along with other people.   Score: 14. Interpretation: 0 to 4: None to minimal. 5 to 9: Mild anxiety. 10 to 14: Moderate anxiety. 15 to 21: Severe anxiety.   Suicide risk screening   Score: Negative screen (based on PHQ-9 responses above).   Action taken based on risk:    Negative screen: Patient completed interview.    Low risk: Patient completed interview. Follow-up per provider discretion.    Moderate risk: Recommended to call [phone] or [phone] or to go to their nearest ER. Patient given option to continue with the interview if those options are not relevant at this time. Follow-up per provider discretion.    High risk: Interview terminated. Recommended to go to ER or to call [phone] or [phone].   Repetitive thoughts and behaviors screening   DSM-5 Level 1 Cross-Cutting Symptom Measure, Section X. 2 items. Response options are: Not at all (0), Rarely (1), Several days (2), More than half the days (3), or Nearly every day (4)   Over the past 2 weeks, patient has been bothered:    (0) Not at all by unpleasant thoughts, urges, or images that repeatedly enter their mind    (1) On 1 to 2 days by feeling driven to repeat certain behaviors or mental acts   Score: 1. Interpretation: 0 to 2 (with 0 to 1 on both items): Negative screen. 2 or higher (with 2 or higher on either item): Positive screen.   Radha/hypomania screening   DSM-5 Level 1 Cross-Cutting Symptom Measure, Section III. 2 items. Response options are: Not at all (0), Rarely (1), Several days (2), More than half the days (3), or Nearly every  day (4)   Over the past 2 weeks, patient has been bothered:    (2) On several days by sleeping less than usual, but still having a lot of energy    (0) Not at all by starting lots more projects than usual or doing more risky things than usual   Score: 2. Interpretation: 0 to 2 (with 1 on both items): Negative screen. 2 or higher (with 2 or higher on at least 1 item): Positive screen; in-interview follow-up with [place] Self-Rating Radha (ASRM) Scale.   [place] Self-Rating Radha (ASRM) Scale. 5 items in which patient chooses the statement that best describes how they have been feeling over the past week.   Patient responses:    (0) I do not feel happier or more cheerful than usual    (0) I do not feel more self-confident than usual    (0) I do not need less sleep than usual    (0) I do not talk more than usual    (3) I have been more active than usual most of the time   Score: 3. Interpretation: A score of 6 or higher indicates a high probability of a manic or hypomanic condition and may indicate a need for treatment and/or further diagnostic workup. A score of 5 or lower is less likely to be associated with significant symptoms of radha.   Psychosis/hallucination screening   DSM-5 Level 1 Cross-Cutting Symptom Measure, Section VII. 2 items. Response options are: Not at all (0), Rarely (1), Several days (2), More than half the days (3), or Nearly every day (4)   Over the past 2 weeks, patient has been bothered:    (0) Not at all by hearing things other people could not hear    (0) Not at all by feeling that someone could hear their thoughts   Score: 0. Interpretation: 0: Negative screen. 1 or higher: Positive screen.   Substance abuse screening   DSM-5 Level 1 Cross-Cutting Symptom Measure, Section XIII. 2 items on use of tobacco, recreational drugs, or prescription medications beyond the amount prescribed or duration of prescription.   Over the past 2 weeks, patient:    (0) Did not use tobacco    (0) Did not use a  recreational or prescription drug on their own   Score: 0. Interpretation: 0 is a negative screen. 1 or higher with positive response for prescription/recreational drug abuse leads to follow-up with Level 2 Cross-Cutting Symptom Measure, Section XIII. 1 or higher with positive response for tobacco use leads to tobacco cessation advice in AVS.   AUDIT-C. 3 items, shown if alcohol use reported above.   During the past year, patient:    (4) Had an alcoholic drink 4 or more times per week    (0) Had 1 to 2 alcoholic drinks on a typical day when they were drinking    (2) Had 6 or more drinks on one occasion monthly   Score: 6. Interpretation: A score of 3 or higher in women is a positive screen for unhealthy drinking.   Comorbid/Exacerbating conditions   History of asthma.   Past mental health history   Previous diagnosis of generalized anxiety disorder and panic attacks.   Family history of mental health disorders   First-degree relative(s) with a history of depression, generalized anxiety disorder, and panic attacks.   Current mental health treatment   Patient is not currently in counseling or therapy. Patient is not currently being seen by a psychiatrist and has not been seen by one in the last 2 years.   Currently taking fluoxetine.   As to effectiveness of current treatment:   Patient is satisfied with fluoxetine. Patient wants to refill fluoxetine.   Previous mental health treatment   Regarding past medication(s) for mental health condition(s), patient writes: Clonazepam.   Vital signs    Height: 160 centimeters    Weight: 63.5 kilograms   Current medications   Currently taking FLUoxetine 20 MG capsule and 5-hydroxytryptophan / melatonin Pill.   Medication allergies   None.   Medication contraindications   None.   Assessment   Major depressive disorder, single, mild.   This diagnosis is based on review of patient interview responses and other available clinical information.    PHQ-9 depression screening score: 10.  Interpretation: 10 to 14: Major Depressive Disorder, Mild.    BIJAN-7 generalized anxiety screening score: 14. Interpretation: 10 to 14: Moderate anxiety.   Suicide risk severity screening was negative.   Based on screening tests, the following areas warrant further evaluation at follow-up:    Alcohol use   Plan   Medications:   No medications prescribed.   Orders:    Referral to behavioral health.   Education:    Condition and causes    Treatment and self-care    Possible medication side effects    When to call provider   ----------   Electronically signed by SONIA Sullivan on 2023-09-07 at 13:41PM   ----------   Patient Interview Transcript:   Have you ever been diagnosed with any of these mental health conditions? Select all that apply.    Generalized anxiety disorder (BIJAN)    Panic attacks   Not selected:    Depression    Post traumatic stress disorder (PTSD)    Obsessive-compulsive disorder (OCD)    Bipolar disorder    Schizophrenia or schizoaffective disorder    A mental health condition not listed here (specify)    None of the above   Are you currently taking medication for any mental health condition? Select one.    Yes   Not selected:    No   Which of these medications are you currently taking for a mental health condition? Select all that apply.    Prozac (fluoxetine)   Not selected:    Atarax or Vistaril (hydroxyzine)    BuSpar (buspirone)    Celexa (citalopram)    Cymbalta or Drizalma Sprinkle (duloxetine)    Effexor or Effexor XR (venlafaxine)    Lexapro (escitalopram)    Paxil, Paxil CR, or Pexeva (paroxetine)    Pristiq (desvenlafaxine)    Remeron (mirtazapine)    Trazodone    Wellbutrin SR, Wellbutrin XL, Forfivo XL, or Aplenzin (bupropion)    Zoloft (sertraline)    Other (specify medication and whether you're satisfied with it)   Have you taken any other medications for this or any other mental health condition in the past? Select one.    Yes   Not selected:    No   Which medications have you taken  in the past for your mental health condition(s)? Select all that apply.    Other (specify medication and whether you were satisfied with it): Clonazepam   Not selected:    Atarax or Vistaril (hydroxyzine)    BuSpar (buspirone)    Celexa (citalopram)    Cymbalta or Drizalma Sprinkle (duloxetine)    Effexor or Effexor XR (venlafaxine)    Lexapro (escitalopram)    Paxil, Paxil CR, or Pexeva (paroxetine)    Pristiq (desvenlafaxine)    Prozac (fluoxetine)    Remeron (mirtazapine)    Trazodone    Wellbutrin SR, Wellbutrin XL, Forfivo XL, or Aplenzin (bupropion)    Zoloft (sertraline)   I'm satisfied with Prozac (fluoxetine)    Yes   Not selected:    No   I want to refill/restart    Yes   Not selected:    No   Have you recently experienced unusual stress from any of these? Select all that apply.    Personal relationships    Home situation    Work   Not selected:    Family    Finances    Something related to COVID-19    Current news and events    None of the above   Are you currently in counseling or therapy? Select one.    No   Not selected:    Yes   Are you currently being seen by a psychiatrist, or have you been seen by a psychiatrist in the last 2 years? Select one.    No   Not selected:    Yes, currently    Yes, within the last 2 years   Do any of these apply to you? Select all that apply.    None of the above   Not selected:    I'm pregnant    I've given birth in the past 12 months    I'm breastfeeding   Do you have any of these medical conditions? Scroll to see all options. Select all that apply.    Asthma   Not selected:    Cancer    Chronic pain    Congestive heart failure    Coronary artery disease (blocked arteries in the heart)    Diabetes    Epilepsy    High blood pressure    Inflammatory arthritis    Kidney disease or history of kidney function problems    Lupus (SLE)    Multiple sclerosis    Parkinson disease    Thyroid disorder    Viral hepatitis    None of the above   Do any of these apply to your  first-degree blood relatives? First-degree blood relatives include parents, siblings, and children who you're related to by birth, not by marriage or adoption. Select all that apply.    Depression    Generalized anxiety disorder (BIJAN)    Panic attacks   Not selected:    Post traumatic stress disorder (PTSD)    Obsessive-compulsive disorder (OCD)    Bipolar disorder    Schizophrenia or schizoaffective disorder    Drug or alcohol addiction (substance use disorder)     by suicide    Attempted suicide    No, not that I know of   Are any of your first-degree blood relatives taking medications for their condition? Select one.    No   Not selected:    Yes    I'm not sure   1. Over the past 2 weeks, how often have you been bothered by: Having little interest or pleasure in doing things Select one.    Several days   Not selected:    Not at all    More than half the days    Nearly every day   2. Over the past 2 weeks, how often have you been bothered by: Feeling down, depressed, or hopeless Select one.    Several days   Not selected:    Not at all    More than half the days    Nearly every day   3. Over the past 2 weeks, how often have you been bothered by: Trouble falling or staying asleep, or sleeping too much Select one.    Nearly every day   Not selected:    Not at all    Several days    More than half the days   4. Over the past 2 weeks, how often have you been bothered by: Feeling tired or having little energy Select one.    More than half the days   Not selected:    Not at all    Several days    Nearly every day   5. Over the past 2 weeks, how often have you been bothered by: Poor appetite or overeating Select one.    More than half the days   Not selected:    Not at all    Several days    Nearly every day   6. Over the past 2 weeks, how often have you been bothered by: Feeling bad about yourself, that you're a failure, or that you've let yourself or friends and family down Select one.    Several days   Not  selected:    Not at all    More than half the days    Nearly every day   7. Over the past 2 weeks, how often have you been bothered by: Trouble concentrating on things like watching TV or reading the news Select one.    Not at all   Not selected:    Several days    More than half the days    Nearly every day   8. Over the past 2 weeks, how often have you been bothered by: Moving or speaking so slowly that other people could have noticed OR Being so fidgety or restless that you have been moving around a lot more than usual Select one.    Not at all   Not selected:    Several days    More than half the days    Nearly every day   9. Over the past 2 weeks, how often have you been bothered by: Thoughts that you'd be better off dead or thoughts of hurting yourself Select one.    Not at all   Not selected:    Several days    More than half the days    Nearly every day   How difficult have these problems made it for you to work, take care of things at home, or get along with other people? Select one.    Somewhat difficult   Not selected:    Not difficult at all    Very difficult    Extremely difficult   1. Over the past 2 weeks, how often have you been bothered by: Feeling nervous, anxious, or on edge? Select one.    Nearly every day   Not selected:    Not at all    Several days    More than half the days   2. Over the past 2 weeks, how often have you been bothered by: Not being able to stop or control worrying? Select one.    Nearly every day   Not selected:    Not at all    Several days    More than half the days   3. Over the past 2 weeks, how often have you been bothered by: Worrying too much about different things? Select one.    Nearly every day   Not selected:    Not at all    Several days    More than half the days   4. Over the past 2 weeks, how often have you been bothered by: Having trouble relaxing? Select one.    Nearly every day   Not selected:    Not at all    Several days    More than half the days   5. Over  "the past 2 weeks, how often have you been bothered by: Being so restless that it's hard to sit still? Select one.    Several days   Not selected:    Not at all    More than half the days    Nearly every day   6. Over the past 2 weeks, how often have you been bothered by: Becoming easily annoyed or irritable? Select one.    Several days   Not selected:    Not at all    More than half the days    Nearly every day   7. Over the past 2 weeks, how often have you been bothered by: Feeling afraid, as if something awful might happen? Select one.    Not at all   Not selected:    Several days    More than half the days    Nearly every day   How difficult have these symptoms made it for you to do your work, take care of things at home, or get along with other people? Select one.    Somewhat difficult   Not selected:    Not difficult at all    Very difficult    Extremely difficult   Over the past 2 weeks, how often have you been bothered by: Sleeping less than usual, but still having a lot of energy? Select one.    Several days   Not selected:    Not at all    1 to 2 days    More than half the days    Nearly every day   Over the past 2 weeks, how often have you been bothered by: Starting lots more projects than usual or doing more risky things than usual? Select one.    Not at all   Not selected:    1 to 2 days    Several days    More than half the days    Nearly every day   Which statement best describes how you've been feeling over the past week? \"Occasionally\" here means once or twice, and \"often\" means several times or more. Select one.    I don't feel happier or more cheerful than usual   Not selected:    I occasionally feel happier or more cheerful than usual    I often feel happier or more cheerful than usual    I feel happier or more cheerful than usual most of the time    I feel happier or more cheerful than usual all of the time   Which statement best describes how you've been feeling over the past week? " "\"Occasionally\" here means once or twice, and \"often\" means several times or more. Select one.    I don't feel more self-confident than usual   Not selected:    I occasionally feel more self-confident than usual    I often feel more self-confident than usual    I feel more self-confident than usual most of the time    I feel extremely self-confident all of the time   Which statement best describes how you've been feeling over the past week? \"Occasionally\" here means once or twice, and \"often\" means several times or more. Select one.    I don't need less sleep than usual   Not selected:    I occasionally need less sleep than usual    I often need less sleep than usual    I need less sleep than usual most of the time    I can go all day and all night without any sleep and still not feel tired   Which statement best describes how you've been feeling over the past week? \"Occasionally\" here means once or twice, and \"often\" means several times or more. Select one.    I don't talk more than usual   Not selected:    I occasionally talk more than usual    I often talk more than usual    I talk more than usual most of the time    I talk constantly and can't be interrupted   Which statement best describes how you've been feeling over the past week? \"Occasionally\" here means once or twice, and \"often\" means several times or more. By \"active,\" we mean socially, sexually, or at work, home, or school. Select one.    I have been more active than usual most of the time   Not selected:    I haven't been more active than usual    I have occasionally been more active than usual    I have often been more active than usual    I am constantly active or on the go all the time   Over the past 2 weeks, how often have you been bothered by: Hearing things other people couldn't hear, such as voices even when no one was around? Select one.    Not at all   Not selected:    1 to 2 days    Several days    More than half the days    Nearly every " "day   Over the past 2 weeks, how often have you been bothered by: Feeling that someone could hear your thoughts, or that you could hear what another person was thinking? Select one.    Not at all   Not selected:    1 to 2 days    Several days    More than half the days    Nearly every day   Over the past 2 weeks, how often have you been bothered by: Unpleasant thoughts, urges, or images that repeatedly enter your mind? Select one.    Not at all   Not selected:    1 to 2 days    Several days    More than half the days    Nearly every day   Over the past 2 weeks, how often have you been bothered by: Feeling driven to perform certain behaviors or mental acts over and over again? Select one.    1 to 2 days   Not selected:    Not at all    Several days    More than half the days    Nearly every day   In the past year, how often did you have a drink containing alcohol? Select one.    4 or more times per week   Not selected:    Never    Monthly or less    2 to 4 times per month    2 to 3 times per week   In the past year, how many drinks did you have on a typical day when you were drinking? Select one.    1 or 2   Not selected:    3 or 4    5 or 6    7 to 9    10 or more   In the past year, how often did you have 6 or more drinks on one occasion? Select one.    Monthly   Not selected:    Never    Less than monthly    Weekly    Daily or almost daily   Over the past 2 weeks, how often have you: Smoked any cigarettes, smoked a cigar or pipe, or used snuff or chewing tobacco? Select one.    Not at all   Not selected:    1 to 2 days    Several days    More than half the days    Nearly every day   Over the past 2 weeks, how often did you use any of these on your own? \"On your own\" means without a doctor's prescription, or more than prescribed, or longer than prescribed. - Prescription painkillers, such as Vicodin - Stimulants, such as Ritalin or Adderall - Sedatives or tranquilizers, such as sleeping pills or Valium - Marijuana " "- Cocaine or crack - Club drugs, such as Ecstasy - Hallucinogens, such as LSD - Heroin - Inhalants or solvents, such as glue - Methamphetamines, such as speed Select one.    Not at all   Not selected:    1 to 2 days    Several days    More than half the days    Nearly every day   Think about all of the symptoms you've shared with us today. How long have you been feeling this way? Select one.    Less than a year   Not selected:    More than a year    I'm not sure   These last few questions help us make sure your treatment plan is safe for you. Do you have any of these conditions? Select all that apply.    None of these   Not selected:    Uncorrected or persistent electrolyte abnormalities, such as potassium, sodium, calcium or magnesium    QT prolongation    Congenital long QT syndrome (LQTS)    Ventricular arrhythmias, such as ventricular fibrillation or ventricular tachycardia    Bradycardia (low heart rate)    Recent heart attack    Congestive heart failure (CHF)   Do any of these apply to you now or in the recent past? \"Cold turkey\" here means stopping a medication suddenly rather than slowly taking lower and lower doses until you're off the medication. Select all that apply.    None of these   Not selected:    Seizure disorder    Bulimia or anorexia    Liver disease    Alcohol abuse    Stopped using alcohol \"cold turkey\"    Stopped using a sedative \"cold turkey\"    Stopped using an anti-seizure drug \"cold turkey\"    Stopped using a benzodiazepine drug (Klonopin, Valium, Ativan, Xanax) \"cold turkey\"   Do any of the following apply to you? Select all that apply.    None of these   Not selected:    I'm currently taking pimozide    I'm currently taking thioridazine    I've taken an MAO inhibitor in the last 14 days    I've taken linezolid or IV methylene blue in the last 14 days   Are you still taking these medications listed in your medical record? If you're not taking any of these, click Next. Select all that " apply.    FLUoxetine 20 MG capsule   Are you taking any other medications, vitamins, or supplements? Select one.    Yes   Not selected:    No   Have you ever had an allergic or bad reaction to any medication? Select one.    No   Not selected:    Yes   Knowing your Body Mass Index (BMI) can help your provider choose the best medication for you. To determine your BMI, we need to know your height and weight. Enter your height.    Height   Enter your weight (in pounds).    Weight   Do you need a doctor's note? A doctor's note confirms that you received care today and states when you can return to school or work. It does not contain information about your diagnosis or treatment plan. Your provider will make the final decision on whether to give you a doctor's note. Doctor's notes CANNOT be backdated. Select one.    No   Not selected:    Today only (1 day)    Today and tomorrow (2 days)    3 days   What is the main reason you're taking this interview today?    I would like to maybe increase my fluoxetine dosage and get something to take at night to help with sleep and anxiety, kolonipin really helped when I was you see and was taking it at night.   ----------   Medical history   Medical history data does not currently exist for this patient.

## 2023-09-20 ENCOUNTER — TELEMEDICINE (OUTPATIENT)
Dept: PSYCHIATRY | Facility: CLINIC | Age: 23
End: 2023-09-20
Payer: MEDICAID

## 2023-09-20 DIAGNOSIS — F41.9 ANXIETY: Primary | ICD-10-CM

## 2023-09-20 DIAGNOSIS — F51.04 PSYCHOPHYSIOLOGICAL INSOMNIA: ICD-10-CM

## 2023-09-20 PROCEDURE — 1159F MED LIST DOCD IN RCRD: CPT | Performed by: NURSE PRACTITIONER

## 2023-09-20 PROCEDURE — 90792 PSYCH DIAG EVAL W/MED SRVCS: CPT | Performed by: NURSE PRACTITIONER

## 2023-09-20 PROCEDURE — 1160F RVW MEDS BY RX/DR IN RCRD: CPT | Performed by: NURSE PRACTITIONER

## 2023-09-20 RX ORDER — FLUOXETINE HYDROCHLORIDE 40 MG/1
40 CAPSULE ORAL DAILY
Qty: 30 CAPSULE | Refills: 2 | Status: SHIPPED | OUTPATIENT
Start: 2023-09-20 | End: 2024-09-19

## 2023-09-20 RX ORDER — TRAZODONE HYDROCHLORIDE 50 MG/1
50 TABLET ORAL NIGHTLY
Qty: 30 TABLET | Refills: 2 | Status: SHIPPED | OUTPATIENT
Start: 2023-09-20

## 2023-09-20 NOTE — PROGRESS NOTES
Patient Name: Wendi Bailey  MRN: [id number]   :  2000     Referring Physician: Gloria Lyons MD    This provider is located in her home office through the [facility] (through [facility]), [address], [address] using a secure [employer] Video Visit through [employer]. Patient is being seen remotely via telehealth at their home address in [place], and stated they are in a secure environment for this session. The patient's condition being diagnosed/treated is appropriate for telemedicine. The provider identified herself as well as her credentials.   The patient, and/or patients guardian, consent to be seen remotely, and when consent is given they understand that the consent allows for patient identifiable information to be sent to a third party as needed.   They may refuse to be seen remotely at any time. The electronic data is encrypted and password protected, and the patient and/or guardian has been advised of the potential risks to privacy not withstanding such measures.    You have chosen to receive care through a telehealth visit.  Do you consent to use a video/audio connection for your medical care today? Yes    Chief Complaint:     ICD-10-CM ICD-9-CM   1. Anxiety  F41.9 300.00   2. Psychophysiological insomnia  F51.04 307.42       HPI:   Wendi Bailey is a 23 y.o. female who is here today for initial evaluation of Anxiety  and Insomnia .  Patient states she has a history of anxiety.  Denies depression.  States there was a break-up with her boyfriend of 6 years.  Patient moved out.  Patient recently increased her Prozac to 40 mg.  Patient takes Klonopin 0.5 mg as needed only.  Patient is a .  Patient states insomnia is significant.  Patient has taken melatonin and/or Benadryl before.  Patient states she sleeps about an hour at a time and is up and down throughout the night.    Past Medical History:   Past Medical History:    Diagnosis Date    Allergic     Anxiety     Asthma        Past Surgical History:   Past Surgical History:   Procedure Laterality Date    FRACTURE SURGERY         Social History:   Social History     Socioeconomic History    Marital status: Single   Tobacco Use    Smoking status: Never    Smokeless tobacco: Never   Vaping Use    Vaping Use: Never used   Substance and Sexual Activity    Alcohol use: Yes     Alcohol/week: 1.0 standard drink     Types: 1 Cans of beer per week    Drug use: Never    Sexual activity: Yes     Partners: Male     Birth control/protection: I.U.D.       Family History:  Family History   Problem Relation Age of Onset    Anxiety disorder Mother     Pulmonary embolism Father     Hypertension Father        Allergy:  Allergies   Allergen Reactions    Codeine Rash       Current Medications:   Current Outpatient Medications   Medication Sig Dispense Refill    albuterol (PROVENTIL) (2.5 MG/3ML) 0.083% nebulizer solution ALBUTEROL SULFATE (2.5 MG/3ML) 0.083% NEBU      albuterol sulfate  (90 Base) MCG/ACT inhaler Inhale 2 puffs Every 4 (Four) Hours As Needed for Wheezing.      FLUoxetine (PROzac) 40 MG capsule Take 1 capsule by mouth Daily. 30 capsule 2    traZODone (DESYREL) 50 MG tablet Take 1 tablet by mouth Every Night. 30 tablet 2     No current facility-administered medications for this visit.       Lab Results:   No visits with results within 3 Month(s) from this visit.   Latest known visit with results is:   Lab on 11/13/2019   Component Date Value Ref Range Status    Glucose 11/13/2019 103 (H)  65 - 99 mg/dL Final    BUN 11/13/2019 16  6 - 20 mg/dL Final    Creatinine 11/13/2019 0.68  0.57 - 1.00 mg/dL Final    Sodium 11/13/2019 141  136 - 145 mmol/L Final    Potassium 11/13/2019 3.9  3.5 - 5.2 mmol/L Final    Chloride 11/13/2019 105  98 - 107 mmol/L Final    CO2 11/13/2019 26.0  22.0 - 29.0 mmol/L Final    Calcium 11/13/2019 9.0  8.6 - 10.5 mg/dL Final    eGFR Non African Amer  11/13/2019 109  >60 mL/min/1.73 Final    BUN/Creatinine Ratio 11/13/2019 23.5  7.0 - 25.0 Final    Anion Gap 11/13/2019 10.0  5.0 - 15.0 mmol/L Final    HCG, Urine QL 11/13/2019 Negative  Negative Final    WBC 11/13/2019 7.86  3.40 - 10.80 10*3/mm3 Final    RBC 11/13/2019 4.66  3.77 - 5.28 10*6/mm3 Final    Hemoglobin 11/13/2019 13.7  12.0 - 15.9 g/dL Final    Hematocrit 11/13/2019 40.4  34.0 - 46.6 % Final    MCV 11/13/2019 86.7  79.0 - 97.0 fL Final    MCH 11/13/2019 29.4  26.6 - 33.0 pg Final    MCHC 11/13/2019 33.9  31.5 - 35.7 g/dL Final    RDW 11/13/2019 12.4  12.3 - 15.4 % Final    RDW-SD 11/13/2019 38.9  37.0 - 54.0 fl Final    MPV 11/13/2019 10.2  6.0 - 12.0 fL Final    Platelets 11/13/2019 387  140 - 450 10*3/mm3 Final    Neutrophil % 11/13/2019 58.2  42.7 - 76.0 % Final    Lymphocyte % 11/13/2019 30.5  19.6 - 45.3 % Final    Monocyte % 11/13/2019 4.6 (L)  5.0 - 12.0 % Final    Eosinophil % 11/13/2019 6.0  0.3 - 6.2 % Final    Basophil % 11/13/2019 0.6  0.0 - 1.5 % Final    Immature Grans % 11/13/2019 0.1  0.0 - 0.5 % Final    Neutrophils, Absolute 11/13/2019 4.57  1.70 - 7.00 10*3/mm3 Final    Lymphocytes, Absolute 11/13/2019 2.40  0.70 - 3.10 10*3/mm3 Final    Monocytes, Absolute 11/13/2019 0.36  0.10 - 0.90 10*3/mm3 Final    Eosinophils, Absolute 11/13/2019 0.47 (H)  0.00 - 0.40 10*3/mm3 Final    Basophils, Absolute 11/13/2019 0.05  0.00 - 0.20 10*3/mm3 Final    Immature Grans, Absolute 11/13/2019 0.01  0.00 - 0.05 10*3/mm3 Final    nRBC 11/13/2019 0.0  0.0 - 0.2 /100 WBC Final       Review of Symptoms:   Review of Systems   Constitutional:  Negative for activity change, appetite change, fatigue, unexpected weight gain and unexpected weight loss.   Respiratory:  Negative for shortness of breath and wheezing.    Gastrointestinal:  Negative for constipation, diarrhea, nausea and vomiting.   Musculoskeletal:  Negative for gait problem.   Skin:  Negative for dry skin and rash.   Neurological:  Negative for  dizziness, speech difficulty, weakness, light-headedness, headache, memory problem and confusion.   Psychiatric/Behavioral:  Positive for sleep disturbance. Negative for agitation, behavioral problems, decreased concentration, dysphoric mood, hallucinations, self-injury, suicidal ideas, negative for hyperactivity, depressed mood and stress. The patient is nervous/anxious.      Physical Exam:   Physical Exam  Constitutional:       General: She is not in acute distress.     Appearance: She is well-developed. She is not diaphoretic.   HENT:      Head: Normocephalic and atraumatic.   Eyes:      Conjunctiva/sclera: Conjunctivae normal.   Pulmonary:      Effort: Pulmonary effort is normal. No respiratory distress.   Musculoskeletal:         General: Normal range of motion.      Cervical back: Full passive range of motion without pain and normal range of motion.   Neurological:      Mental Status: She is alert and oriented to person, place, and time.   Psychiatric:         Mood and Affect: Mood is anxious. Mood is not depressed. Affect is not labile, blunt, angry or inappropriate.         Speech: Speech is not rapid and pressured or tangential.         Behavior: Behavior normal. Behavior is not agitated, slowed, aggressive, withdrawn, hyperactive or combative. Behavior is cooperative.         Thought Content: Thought content normal. Thought content is not paranoid or delusional. Thought content does not include homicidal or suicidal ideation. Thought content does not include homicidal or suicidal plan.         Judgment: Judgment normal.     There were no vitals taken for this visit.  There is no height or weight on file to calculate BMI. Video appt unable to obtain.     Mental Status Exam:   Appearance: appropriate  Hygiene:   good  Cooperation:  Cooperative  Eye Contact:  Good  Psychomotor Behavior:  Appropriate  Mood:anxious  Affect:  Appropriate  Hopelessness: Denies  Speech:  Normal  Thought Process:  Goal  directed  Thought Content:  Normal  Suicidal:  None  Homicidal:  None  Hallucinations:  None  Delusion:  None  Memory:  Intact  Orientation:  Person, Place, Time, and Situation  Reliability:  good  Insight:  Good  Judgement:  Good  Impulse Control:  Good    PHQ-9 Depression Screening  Little interest or pleasure in doing things?     Feeling down, depressed, or hopeless?     Trouble falling or staying asleep, or sleeping too much?     Feeling tired or having little energy?     Poor appetite or overeating?     Feeling bad about yourself - or that you are a failure or have let yourself or your family down?     Trouble concentrating on things, such as reading the newspaper or watching television?     Moving or speaking so slowly that other people could have noticed? Or the opposite - being so fidgety or restless that you have been moving around a lot more than usual?     Thoughts that you would be better off dead, or of hurting yourself in some way?     PHQ-9 Total Score     If you checked off any problems, how difficult have these problems made it for you to do your work, take care of things at home, or get along with other people?        Reviewed [facility] # [id number]     Assessment/Plan:   Diagnoses and all orders for this visit:    1. Anxiety (Primary)  -     FLUoxetine (PROzac) 40 MG capsule; Take 1 capsule by mouth Daily.  Dispense: 30 capsule; Refill: 2    2. Psychophysiological insomnia  -     traZODone (DESYREL) 50 MG tablet; Take 1 tablet by mouth Every Night.  Dispense: 30 tablet; Refill: 2    Patient struggling with anxiety and insomnia.  For insomnia we will add trazodone 50 mg at bedtime.  Patient had increased her Prozac to 40 mg.  We will continue this as she has seen some improvement at this dose.    A psychological evaluation was conducted in order to assess past and current level of functioning. Areas assessed included, but were not limited to: perception of social support, perception of ability to face  and deal with challenges in life (positive functioning), anxiety symptoms, depressive symptoms, perspective on beliefs/belief system, coping skills for stress, intelligence level,  Therapeutic rapport was established. Interventions conducted today were geared towards incorporating medication management along with support for continued therapy. Education was also provided as to the med management with this provider and what to expect in subsequent sessions.    We discussed risks, benefits,goals and side effects of the above medication and the patient was agreeable with the plan.Patient was educated on the importance of compliance with treatment and follow-up appointments. Patient is aware to contact the [facility] [phone] with any worsening of symptoms. To call for questions or concerns and return early if necessary. Patent is agreeable to go to the Emergency Department or call 911 should they begin SI/HI.     Part of this note may be an electronic transcription/translation of spoken language to printed text using the Dragon Dictation System.    Return in about 4 weeks (around 10/18/2023) for Follow Up 30 min, Video visit.    SONIA Torres

## 2023-11-15 ENCOUNTER — TELEMEDICINE (OUTPATIENT)
Dept: PSYCHIATRY | Facility: CLINIC | Age: 23
End: 2023-11-15
Payer: MEDICAID

## 2023-11-15 DIAGNOSIS — F51.04 PSYCHOPHYSIOLOGICAL INSOMNIA: ICD-10-CM

## 2023-11-15 DIAGNOSIS — F41.9 ANXIETY: Primary | ICD-10-CM

## 2023-11-15 PROCEDURE — 99214 OFFICE O/P EST MOD 30 MIN: CPT | Performed by: NURSE PRACTITIONER

## 2023-11-15 RX ORDER — FLUOXETINE HYDROCHLORIDE 40 MG/1
40 CAPSULE ORAL DAILY
Qty: 30 CAPSULE | Refills: 2 | Status: SHIPPED | OUTPATIENT
Start: 2023-11-15 | End: 2024-11-14

## 2023-11-15 RX ORDER — TRAZODONE HYDROCHLORIDE 50 MG/1
50 TABLET ORAL NIGHTLY
Qty: 30 TABLET | Refills: 2 | Status: SHIPPED | OUTPATIENT
Start: 2023-11-15

## 2023-11-15 NOTE — PROGRESS NOTES
Patient Name: Wendi Bailye  MRN: [id number]   :  2000     This provider is located at her home office through the [facility] (through [facility]), [address], [address] using a secure [employer] Video Visit through [email or web address]. Patient is being seen remotely via telehealth at their home address in [place], and stated they are in a secure environment for this session. The patient's condition being diagnosed/treated is appropriate for telemedicine. The provider identified herself as well as her credentials.   The patient, and/or patients guardian, consent to be seen remotely, and when consent is given they understand that the consent allows for patient identifiable information to be sent to a third party as needed.   They may refuse to be seen remotely at any time. The electronic data is encrypted and password protected, and the patient and/or guardian has been advised of the potential risks to privacy not withstanding such measures.    You have chosen to receive care through a telehealth visit.  Do you consent to use a video/audio connection for your medical care today? Yes    Chief Complaint:      ICD-10-CM ICD-9-CM   1. Anxiety  F41.9 300.00   2. Psychophysiological insomnia  F51.04 307.42       History of Present Illness: Wendi Bailey is a 23 y.o. female is here today for medication management follow up.  Patient states anxiety has improved a lot with the increase in Prozac.  Patient states she is sleeping now 8 to 9 hours with taking half of a trazodone and she does not feel groggy in the morning.    The following portions of the patient's history were reviewed and updated as appropriate: allergies, current medications, past family history, past medical history, past social history, past surgical history, and problem list.    Review of Systems;;  Review of Systems   Constitutional:  Negative for activity change, appetite change, fatigue,  unexpected weight gain and unexpected weight loss.   Respiratory:  Negative for shortness of breath and wheezing.    Gastrointestinal:  Negative for constipation, diarrhea, nausea and vomiting.   Musculoskeletal:  Negative for gait problem.   Skin:  Negative for dry skin and rash.   Neurological:  Negative for dizziness, speech difficulty, weakness, light-headedness, headache, memory problem and confusion.   Psychiatric/Behavioral:  Negative for agitation, behavioral problems, decreased concentration, dysphoric mood, hallucinations, self-injury, sleep disturbance, suicidal ideas, negative for hyperactivity, depressed mood and stress. The patient is not nervous/anxious.        Physical Exam;;  Physical Exam  Constitutional:       General: She is not in acute distress.     Appearance: She is well-developed. She is not diaphoretic.   HENT:      Head: Normocephalic and atraumatic.   Eyes:      Conjunctiva/sclera: Conjunctivae normal.   Pulmonary:      Effort: Pulmonary effort is normal. No respiratory distress.   Musculoskeletal:         General: Normal range of motion.      Cervical back: Full passive range of motion without pain and normal range of motion.   Neurological:      Mental Status: She is alert and oriented to person, place, and time.   Psychiatric:         Mood and Affect: Mood is not anxious or depressed. Affect is not labile, blunt, angry or inappropriate.         Speech: Speech is not rapid and pressured or tangential.         Behavior: Behavior normal. Behavior is not agitated, slowed, aggressive, withdrawn, hyperactive or combative. Behavior is cooperative.         Thought Content: Thought content normal. Thought content is not paranoid or delusional. Thought content does not include homicidal or suicidal ideation. Thought content does not include homicidal or suicidal plan.         Judgment: Judgment normal.       There were no vitals taken for this visit.  There is no height or weight on file to  calculate BMI. Video appt unable to obtain.     Current Medications;;    Current Outpatient Medications:     FLUoxetine (PROzac) 40 MG capsule, Take 1 capsule by mouth Daily., Disp: 30 capsule, Rfl: 2    traZODone (DESYREL) 50 MG tablet, Take 1 tablet by mouth Every Night., Disp: 30 tablet, Rfl: 2    albuterol (PROVENTIL) (2.5 MG/3ML) 0.083% nebulizer solution, ALBUTEROL SULFATE (2.5 MG/3ML) 0.083% NEBU, Disp: , Rfl:     albuterol sulfate  (90 Base) MCG/ACT inhaler, Inhale 2 puffs Every 4 (Four) Hours As Needed for Wheezing., Disp: , Rfl:     Lab Results:   No visits with results within 3 Month(s) from this visit.   Latest known visit with results is:   Lab on 11/13/2019   Component Date Value Ref Range Status    Glucose 11/13/2019 103 (H)  65 - 99 mg/dL Final    BUN 11/13/2019 16  6 - 20 mg/dL Final    Creatinine 11/13/2019 0.68  0.57 - 1.00 mg/dL Final    Sodium 11/13/2019 141  136 - 145 mmol/L Final    Potassium 11/13/2019 3.9  3.5 - 5.2 mmol/L Final    Chloride 11/13/2019 105  98 - 107 mmol/L Final    CO2 11/13/2019 26.0  22.0 - 29.0 mmol/L Final    Calcium 11/13/2019 9.0  8.6 - 10.5 mg/dL Final    eGFR Non  Amer 11/13/2019 109  >60 mL/min/1.73 Final    BUN/Creatinine Ratio 11/13/2019 23.5  7.0 - 25.0 Final    Anion Gap 11/13/2019 10.0  5.0 - 15.0 mmol/L Final    HCG, Urine QL 11/13/2019 Negative  Negative Final    WBC 11/13/2019 7.86  3.40 - 10.80 10*3/mm3 Final    RBC 11/13/2019 4.66  3.77 - 5.28 10*6/mm3 Final    Hemoglobin 11/13/2019 13.7  12.0 - 15.9 g/dL Final    Hematocrit 11/13/2019 40.4  34.0 - 46.6 % Final    MCV 11/13/2019 86.7  79.0 - 97.0 fL Final    MCH 11/13/2019 29.4  26.6 - 33.0 pg Final    MCHC 11/13/2019 33.9  31.5 - 35.7 g/dL Final    RDW 11/13/2019 12.4  12.3 - 15.4 % Final    RDW-SD 11/13/2019 38.9  37.0 - 54.0 fl Final    MPV 11/13/2019 10.2  6.0 - 12.0 fL Final    Platelets 11/13/2019 387  140 - 450 10*3/mm3 Final    Neutrophil % 11/13/2019 58.2  42.7 - 76.0 % Final     Lymphocyte % 11/13/2019 30.5  19.6 - 45.3 % Final    Monocyte % 11/13/2019 4.6 (L)  5.0 - 12.0 % Final    Eosinophil % 11/13/2019 6.0  0.3 - 6.2 % Final    Basophil % 11/13/2019 0.6  0.0 - 1.5 % Final    Immature Grans % 11/13/2019 0.1  0.0 - 0.5 % Final    Neutrophils, Absolute 11/13/2019 4.57  1.70 - 7.00 10*3/mm3 Final    Lymphocytes, Absolute 11/13/2019 2.40  0.70 - 3.10 10*3/mm3 Final    Monocytes, Absolute 11/13/2019 0.36  0.10 - 0.90 10*3/mm3 Final    Eosinophils, Absolute 11/13/2019 0.47 (H)  0.00 - 0.40 10*3/mm3 Final    Basophils, Absolute 11/13/2019 0.05  0.00 - 0.20 10*3/mm3 Final    Immature Grans, Absolute 11/13/2019 0.01  0.00 - 0.05 10*3/mm3 Final    nRBC 11/13/2019 0.0  0.0 - 0.2 /100 WBC Final       Mental Status Exam:   Hygiene:   good  Cooperation:  Cooperative  Eye Contact:  Good  Psychomotor Behavior:  Appropriate  Mood:within normal limits  Affect:  Appropriate  Hopelessness: Denies  Speech:  Normal  Thought Process:  Goal directed  Thought Content:  Normal  Suicidal:  None  Homicidal:  None  Hallucinations:  None  Delusion:  None  Memory:  Intact  Orientation:  Person, Place, Time, and Situation  Reliability:  good  Insight:  Good  Judgement:  Good  Impulse Control:  Good    PHQ-9 Depression Screening  Little interest or pleasure in doing things? (P) 1-->several days   Feeling down, depressed, or hopeless? (P) 1-->several days   Trouble falling or staying asleep, or sleeping too much? (P) 1-->several days   Feeling tired or having little energy? (P) 1-->several days   Poor appetite or overeating? (P) 0-->not at all   Feeling bad about yourself - or that you are a failure or have let yourself or your family down? (P) 0-->not at all   Trouble concentrating on things, such as reading the newspaper or watching television? (P) 0-->not at all   Moving or speaking so slowly that other people could have noticed? Or the opposite - being so fidgety or restless that you have been moving around a lot  more than usual? (P) 1-->several days   Thoughts that you would be better off dead, or of hurting yourself in some way? (P) 0-->not at all   PHQ-9 Total Score (P) 5   If you checked off any problems, how difficult have these problems made it for you to do your work, take care of things at home, or get along with other people? (P) somewhat difficult        Assessment/Plan:  Diagnoses and all orders for this visit:    1. Anxiety (Primary)  -     FLUoxetine (PROzac) 40 MG capsule; Take 1 capsule by mouth Daily.  Dispense: 30 capsule; Refill: 2    2. Psychophysiological insomnia  -     traZODone (DESYREL) 50 MG tablet; Take 1 tablet by mouth Every Night.  Dispense: 30 tablet; Refill: 2      Patient states anxiety has improved significantly with the increase in Prozac.  Patient's sleep is doing well with taking half of the trazodone.  We will follow-up in 3 months.    A psychological evaluation was conducted in order to assess past and current level of functioning. Areas assessed included, but were not limited to: perception of social support, perception of ability to face and deal with challenges in life (positive functioning), anxiety symptoms, depressive symptoms, perspective on beliefs/belief system, coping skills for stress, intelligence level,  Therapeutic rapport was established. Interventions conducted today were geared towards incorporating medication management along with support for continued therapy. Education was also provided as to the med management with this provider and what to expect in subsequent sessions.    We discussed risks, benefits,goals and side effects of the above medication and the patient was agreeable with the plan.Patient was educated on the importance of compliance with treatment and follow-up appointments. Patient is aware to contact the [facility] [phone] with any worsening of symptoms. To call for questions or concerns and return early if necessary.  Patent is agreeable to go to the Emergency Department or call 911 should they begin SI/HI.     Part of this note may be an electronic transcription/translation of spoken language to printed text using the Dragon Dictation System.    Return in about 3 months (around 2/15/2024) for Follow Up 30 min, Video visit.    Leslye Camacho, APRN

## 2024-02-12 ENCOUNTER — TELEMEDICINE (OUTPATIENT)
Dept: PSYCHIATRY | Facility: CLINIC | Age: 24
End: 2024-02-12

## 2024-02-12 DIAGNOSIS — F41.9 ANXIETY: Primary | ICD-10-CM

## 2024-02-12 DIAGNOSIS — F51.04 PSYCHOPHYSIOLOGICAL INSOMNIA: ICD-10-CM

## 2024-02-12 PROCEDURE — 99214 OFFICE O/P EST MOD 30 MIN: CPT | Performed by: NURSE PRACTITIONER

## 2024-02-12 RX ORDER — FLUOXETINE HYDROCHLORIDE 40 MG/1
40 CAPSULE ORAL DAILY
Qty: 30 CAPSULE | Refills: 2 | Status: SHIPPED | OUTPATIENT
Start: 2024-02-12 | End: 2025-02-11

## 2024-02-12 RX ORDER — TRAZODONE HYDROCHLORIDE 50 MG/1
50 TABLET ORAL NIGHTLY
Qty: 30 TABLET | Refills: 2 | Status: SHIPPED | OUTPATIENT
Start: 2024-02-12

## 2024-02-12 NOTE — PROGRESS NOTES
Patient Name: Wendi Bailey  MRN: [id number]   :  2000     This provider is located at her home office through the [facility] (through [facility]), [address], [address] using a secure [employer] Video Visit through [employer]. Patient is being seen remotely via telehealth at their home address in [place], and stated they are in a secure environment for this session. The patient's condition being diagnosed/treated is appropriate for telemedicine. The provider identified herself as well as her credentials.   The patient, and/or patients guardian, consent to be seen remotely, and when consent is given they understand that the consent allows for patient identifiable information to be sent to a third party as needed.   They may refuse to be seen remotely at any time. The electronic data is encrypted and password protected, and the patient and/or guardian has been advised of the potential risks to privacy not withstanding such measures.    You have chosen to receive care through a telehealth visit.  Do you consent to use a video/audio connection for your medical care today? Yes    Chief Complaint:      ICD-10-CM ICD-9-CM   1. Anxiety  F41.9 300.00   2. Psychophysiological insomnia  F51.04 307.42       History of Present Illness: Wendi Bailey is a 23 y.o. female is here today for medication management follow up.  Patient feels medications are working well for anxiety and insomnia.  Feels she would like to leave medication as it is.    The following portions of the patient's history were reviewed and updated as appropriate: allergies, current medications, past family history, past medical history, past social history, past surgical history, and problem list.    Review of Systems;;  Review of Systems   Constitutional:  Negative for activity change, appetite change, fatigue, unexpected weight gain and unexpected weight loss.   Respiratory:  Negative for  shortness of breath and wheezing.    Gastrointestinal:  Negative for constipation, diarrhea, nausea and vomiting.   Musculoskeletal:  Negative for gait problem.   Skin:  Negative for dry skin and rash.   Neurological:  Negative for dizziness, speech difficulty, weakness, light-headedness, headache, memory problem and confusion.   Psychiatric/Behavioral:  Negative for agitation, behavioral problems, decreased concentration, dysphoric mood, hallucinations, self-injury, sleep disturbance, suicidal ideas, negative for hyperactivity, depressed mood and stress. The patient is not nervous/anxious.        Physical Exam;;  Physical Exam  Constitutional:       General: She is not in acute distress.     Appearance: She is well-developed. She is not diaphoretic.   HENT:      Head: Normocephalic and atraumatic.   Eyes:      Conjunctiva/sclera: Conjunctivae normal.   Pulmonary:      Effort: Pulmonary effort is normal. No respiratory distress.   Musculoskeletal:         General: Normal range of motion.      Cervical back: Full passive range of motion without pain and normal range of motion.   Neurological:      Mental Status: She is alert and oriented to person, place, and time.   Psychiatric:         Mood and Affect: Mood is not anxious or depressed. Affect is not labile, blunt, angry or inappropriate.         Speech: Speech is not rapid and pressured or tangential.         Behavior: Behavior normal. Behavior is not agitated, slowed, aggressive, withdrawn, hyperactive or combative. Behavior is cooperative.         Thought Content: Thought content normal. Thought content is not paranoid or delusional. Thought content does not include homicidal or suicidal ideation. Thought content does not include homicidal or suicidal plan.         Judgment: Judgment normal.       There were no vitals taken for this visit.  There is no height or weight on file to calculate BMI. [facility] appt unable to obtain.     Current Medications;;    Current  Outpatient Medications:     FLUoxetine (PROzac) 40 MG capsule, Take 1 capsule by mouth Daily., Disp: 30 capsule, Rfl: 2    traZODone (DESYREL) 50 MG tablet, Take 1 tablet by mouth Every Night., Disp: 30 tablet, Rfl: 2    albuterol (PROVENTIL) (2.5 MG/3ML) 0.083% nebulizer solution, ALBUTEROL SULFATE (2.5 MG/3ML) 0.083% NEBU, Disp: , Rfl:     albuterol sulfate  (90 Base) MCG/ACT inhaler, Inhale 2 puffs Every 4 (Four) Hours As Needed for Wheezing., Disp: , Rfl:     Lab Results:   No visits with results within 3 Month(s) from this visit.   Latest known visit with results is:   Lab on 11/13/2019   Component Date Value Ref Range Status    Glucose 11/13/2019 103 (H)  65 - 99 mg/dL Final    BUN 11/13/2019 16  6 - 20 mg/dL Final    Creatinine 11/13/2019 0.68  0.57 - 1.00 mg/dL Final    Sodium 11/13/2019 141  136 - 145 mmol/L Final    Potassium 11/13/2019 3.9  3.5 - 5.2 mmol/L Final    Chloride 11/13/2019 105  98 - 107 mmol/L Final    CO2 11/13/2019 26.0  22.0 - 29.0 mmol/L Final    Calcium 11/13/2019 9.0  8.6 - 10.5 mg/dL Final    eGFR Non  Amer 11/13/2019 109  >60 mL/min/1.73 Final    BUN/Creatinine Ratio 11/13/2019 23.5  7.0 - 25.0 Final    Anion Gap 11/13/2019 10.0  5.0 - 15.0 mmol/L Final    HCG, Urine QL 11/13/2019 Negative  Negative Final    WBC 11/13/2019 7.86  3.40 - 10.80 10*3/mm3 Final    RBC 11/13/2019 4.66  3.77 - 5.28 10*6/mm3 Final    Hemoglobin 11/13/2019 13.7  12.0 - 15.9 g/dL Final    Hematocrit 11/13/2019 40.4  34.0 - 46.6 % Final    MCV 11/13/2019 86.7  79.0 - 97.0 fL Final    MCH 11/13/2019 29.4  26.6 - 33.0 pg Final    MCHC 11/13/2019 33.9  31.5 - 35.7 g/dL Final    RDW 11/13/2019 12.4  12.3 - 15.4 % Final    RDW-SD 11/13/2019 38.9  37.0 - 54.0 fl Final    MPV 11/13/2019 10.2  6.0 - 12.0 fL Final    Platelets 11/13/2019 387  140 - 450 10*3/mm3 Final    Neutrophil % 11/13/2019 58.2  42.7 - 76.0 % Final    Lymphocyte % 11/13/2019 30.5  19.6 - 45.3 % Final    Monocyte % 11/13/2019 4.6 (L)   5.0 - 12.0 % Final    Eosinophil % 11/13/2019 6.0  0.3 - 6.2 % Final    Basophil % 11/13/2019 0.6  0.0 - 1.5 % Final    Immature Grans % 11/13/2019 0.1  0.0 - 0.5 % Final    Neutrophils, Absolute 11/13/2019 4.57  1.70 - 7.00 10*3/mm3 Final    Lymphocytes, Absolute 11/13/2019 2.40  0.70 - 3.10 10*3/mm3 Final    Monocytes, Absolute 11/13/2019 0.36  0.10 - 0.90 10*3/mm3 Final    Eosinophils, Absolute 11/13/2019 0.47 (H)  0.00 - 0.40 10*3/mm3 Final    Basophils, Absolute 11/13/2019 0.05  0.00 - 0.20 10*3/mm3 Final    Immature Grans, Absolute 11/13/2019 0.01  0.00 - 0.05 10*3/mm3 Final    nRBC 11/13/2019 0.0  0.0 - 0.2 /100 WBC Final       Mental Status Exam:   Hygiene:   good  Cooperation:  Cooperative  Eye Contact:  Good  Psychomotor Behavior:  Appropriate  Mood:within normal limits  Affect:  Appropriate  Hopelessness: Denies  Speech:  Normal  Thought Process:  Goal directed  Thought Content:  Normal  Suicidal:  None  Homicidal:  None  Hallucinations:  None  Delusion:  None  Memory:  Intact  Orientation:  Person, Place, Time, and Situation  Reliability:  good  Insight:  Good  Judgement:  Good  Impulse Control:  Good    PHQ-9 Depression Screening  Little interest or pleasure in doing things? (P) 0-->not at all   Feeling down, depressed, or hopeless? (P) 0-->not at all   Trouble falling or staying asleep, or sleeping too much? (P) 1-->several days   Feeling tired or having little energy? (P) 0-->not at all   Poor appetite or overeating? (P) 0-->not at all   Feeling bad about yourself - or that you are a failure or have let yourself or your family down? (P) 0-->not at all   Trouble concentrating on things, such as reading the newspaper or watching television? (P) 0-->not at all   Moving or speaking so slowly that other people could have noticed? Or the opposite - being so fidgety or restless that you have been moving around a lot more than usual? (P) 0-->not at all   Thoughts that you would be better off dead, or of  hurting yourself in some way? (P) 0-->not at all   PHQ-9 Total Score (P) 1   If you checked off any problems, how difficult have these problems made it for you to do your work, take care of things at home, or get along with other people? (P) not difficult at all        Assessment/Plan:  Diagnoses and all orders for this visit:    1. Anxiety (Primary)  -     FLUoxetine (PROzac) 40 MG capsule; Take 1 capsule by mouth Daily.  Dispense: 30 capsule; Refill: 2    2. Psychophysiological insomnia  -     traZODone (DESYREL) 50 MG tablet; Take 1 tablet by mouth Every Night.  Dispense: 30 tablet; Refill: 2      Patient feels anxiety and insomnia are stable with current medications.  We will follow-up in 3 months.    A psychological evaluation was conducted in order to assess past and current level of functioning. Areas assessed included, but were not limited to: perception of social support, perception of ability to face and deal with challenges in life (positive functioning), anxiety symptoms, depressive symptoms, perspective on beliefs/belief system, coping skills for stress, intelligence level,  Therapeutic rapport was established. Interventions conducted today were geared towards incorporating medication management along with support for continued therapy. Education was also provided as to the med management with this provider and what to expect in subsequent sessions.    We discussed risks, benefits,goals and side effects of the above medication and the patient was agreeable with the plan.Patient was educated on the importance of compliance with treatment and follow-up appointments. Patient is aware to contact the [facility] [phone] with any worsening of symptoms. To call for questions or concerns and return early if necessary. Patent is agreeable to go to the Emergency Department or call 911 should they begin SI/HI.     Part of this note may be an electronic transcription/translation of  spoken language to printed text using the Dragon Dictation System.    Return in about 3 months (around 5/12/2024) for Follow Up 30 min, Video visit.    Leslye Camacho, SONIA

## 2024-05-28 ENCOUNTER — TELEMEDICINE (OUTPATIENT)
Dept: PSYCHIATRY | Facility: CLINIC | Age: 24
End: 2024-05-28

## 2024-05-28 DIAGNOSIS — F51.04 PSYCHOPHYSIOLOGICAL INSOMNIA: ICD-10-CM

## 2024-05-28 DIAGNOSIS — F41.9 ANXIETY: Primary | ICD-10-CM

## 2024-05-28 PROCEDURE — 99214 OFFICE O/P EST MOD 30 MIN: CPT | Performed by: NURSE PRACTITIONER

## 2024-05-28 RX ORDER — FLUOXETINE HYDROCHLORIDE 20 MG/1
20 CAPSULE ORAL DAILY
Qty: 30 CAPSULE | Refills: 3 | Status: SHIPPED | OUTPATIENT
Start: 2024-05-28

## 2024-05-28 NOTE — PROGRESS NOTES
Patient Name: Wendi Bailey  MRN: [id number]   :  2000     This provider is located at her home office through the [facility] (through [facility]), [address], [address] using a secure [employer] Video Visit through [employer]. Patient is being seen remotely via telehealth at their home address in [place], and stated they are in a secure environment for this session. The patient's condition being diagnosed/treated is appropriate for telemedicine. The provider identified herself as well as her credentials.   The patient, and/or patients guardian, consent to be seen remotely, and when consent is given they understand that the consent allows for patient identifiable information to be sent to a third party as needed.   They may refuse to be seen remotely at any time. The electronic data is encrypted and password protected, and the patient and/or guardian has been advised of the potential risks to privacy not withstanding such measures.    You have chosen to receive care through a telehealth visit.  Do you consent to use a video/audio connection for your medical care today? Yes    Chief Complaint:      ICD-10-CM ICD-9-CM   1. Anxiety  F41.9 300.00   2. Psychophysiological insomnia  F51.04 307.42       History of Present Illness: Wendi Bailey is a 23 y.o. female is here today for medication management follow up.  Patient feels Prozac is doing a good job with anxiety.  States she has not needed to take the trazodone for sleep.  States she had 1 to 2 weeks that were difficult however overall things are going well.    The following portions of the patient's history were reviewed and updated as appropriate: allergies, current medications, past family history, past medical history, past social history, past surgical history, and problem list.    Review of Systems;;  Review of Systems   Constitutional:  Negative for activity change, appetite change, fatigue,  unexpected weight gain and unexpected weight loss.   Respiratory:  Negative for shortness of breath and wheezing.    Gastrointestinal:  Negative for constipation, diarrhea, nausea and vomiting.   Musculoskeletal:  Negative for gait problem.   Skin:  Negative for dry skin and rash.   Neurological:  Negative for dizziness, speech difficulty, weakness, light-headedness, headache, memory problem and confusion.   Psychiatric/Behavioral:  Negative for agitation, behavioral problems, decreased concentration, dysphoric mood, hallucinations, self-injury, sleep disturbance, suicidal ideas, negative for hyperactivity, depressed mood and stress. The patient is not nervous/anxious.        Physical Exam;;  Physical Exam  Constitutional:       General: She is not in acute distress.     Appearance: She is well-developed. She is not diaphoretic.   HENT:      Head: Normocephalic and atraumatic.   Eyes:      Conjunctiva/sclera: Conjunctivae normal.   Pulmonary:      Effort: Pulmonary effort is normal. No respiratory distress.   Musculoskeletal:         General: Normal range of motion.      Cervical back: Full passive range of motion without pain and normal range of motion.   Neurological:      Mental Status: She is alert and oriented to person, place, and time.   Psychiatric:         Mood and Affect: Mood is not anxious or depressed. Affect is not labile, blunt, angry or inappropriate.         Speech: Speech is not rapid and pressured or tangential.         Behavior: Behavior normal. Behavior is not agitated, slowed, aggressive, withdrawn, hyperactive or combative. Behavior is cooperative.         Thought Content: Thought content normal. Thought content is not paranoid or delusional. Thought content does not include homicidal or suicidal ideation. Thought content does not include homicidal or suicidal plan.         Judgment: Judgment normal.       There were no vitals taken for this visit.  There is no height or weight on file to  calculate BMI. Video appt unable to obtain.     Current Medications;;    Current Outpatient Medications:     albuterol (PROVENTIL) (2.5 MG/3ML) 0.083% nebulizer solution, ALBUTEROL SULFATE (2.5 MG/3ML) 0.083% NEBU, Disp: , Rfl:     albuterol sulfate  (90 Base) MCG/ACT inhaler, Inhale 2 puffs Every 4 (Four) Hours As Needed for Wheezing., Disp: , Rfl:     FLUoxetine (PROzac) 20 MG capsule, Take 1 capsule by mouth Daily., Disp: 30 capsule, Rfl: 3    Lab Results:   No visits with results within 3 Month(s) from this visit.   Latest known visit with results is:   Lab on 11/13/2019   Component Date Value Ref Range Status    Glucose 11/13/2019 103 (H)  65 - 99 mg/dL Final    BUN 11/13/2019 16  6 - 20 mg/dL Final    Creatinine 11/13/2019 0.68  0.57 - 1.00 mg/dL Final    Sodium 11/13/2019 141  136 - 145 mmol/L Final    Potassium 11/13/2019 3.9  3.5 - 5.2 mmol/L Final    Chloride 11/13/2019 105  98 - 107 mmol/L Final    CO2 11/13/2019 26.0  22.0 - 29.0 mmol/L Final    Calcium 11/13/2019 9.0  8.6 - 10.5 mg/dL Final    eGFR Non  Amer 11/13/2019 109  >60 mL/min/1.73 Final    BUN/Creatinine Ratio 11/13/2019 23.5  7.0 - 25.0 Final    Anion Gap 11/13/2019 10.0  5.0 - 15.0 mmol/L Final    HCG, Urine QL 11/13/2019 Negative  Negative Final    WBC 11/13/2019 7.86  3.40 - 10.80 10*3/mm3 Final    RBC 11/13/2019 4.66  3.77 - 5.28 10*6/mm3 Final    Hemoglobin 11/13/2019 13.7  12.0 - 15.9 g/dL Final    Hematocrit 11/13/2019 40.4  34.0 - 46.6 % Final    MCV 11/13/2019 86.7  79.0 - 97.0 fL Final    MCH 11/13/2019 29.4  26.6 - 33.0 pg Final    MCHC 11/13/2019 33.9  31.5 - 35.7 g/dL Final    RDW 11/13/2019 12.4  12.3 - 15.4 % Final    RDW-SD 11/13/2019 38.9  37.0 - 54.0 fl Final    MPV 11/13/2019 10.2  6.0 - 12.0 fL Final    Platelets 11/13/2019 387  140 - 450 10*3/mm3 Final    Neutrophil % 11/13/2019 58.2  42.7 - 76.0 % Final    Lymphocyte % 11/13/2019 30.5  19.6 - 45.3 % Final    Monocyte % 11/13/2019 4.6 (L)  5.0 - 12.0 % Final     Eosinophil % 11/13/2019 6.0  0.3 - 6.2 % Final    Basophil % 11/13/2019 0.6  0.0 - 1.5 % Final    Immature Grans % 11/13/2019 0.1  0.0 - 0.5 % Final    Neutrophils, Absolute 11/13/2019 4.57  1.70 - 7.00 10*3/mm3 Final    Lymphocytes, Absolute 11/13/2019 2.40  0.70 - 3.10 10*3/mm3 Final    Monocytes, Absolute 11/13/2019 0.36  0.10 - 0.90 10*3/mm3 Final    Eosinophils, Absolute 11/13/2019 0.47 (H)  0.00 - 0.40 10*3/mm3 Final    Basophils, Absolute 11/13/2019 0.05  0.00 - 0.20 10*3/mm3 Final    Immature Grans, Absolute 11/13/2019 0.01  0.00 - 0.05 10*3/mm3 Final    nRBC 11/13/2019 0.0  0.0 - 0.2 /100 WBC Final       Mental Status Exam:   Hygiene:   good  Cooperation:  Cooperative  Eye Contact:  Good  Psychomotor Behavior:  Appropriate  Mood:within normal limits  Affect:  Appropriate  Hopelessness: Denies  Speech:  Normal  Thought Process:  Goal directed  Thought Content:  Normal  Suicidal:  None  Homicidal:  None  Hallucinations:  None  Delusion:  None  Memory:  Intact  Orientation:  Person, Place, Time, and Situation  Reliability:  good  Insight:  Good  Judgement:  Good  Impulse Control:  Good    PHQ-9 Depression Screening  Little interest or pleasure in doing things? (P) 0-->not at all   Feeling down, depressed, or hopeless? (P) 0-->not at all   Trouble falling or staying asleep, or sleeping too much? (P) 0-->not at all   Feeling tired or having little energy? (P) 1-->several days   Poor appetite or overeating? (P) 0-->not at all   Feeling bad about yourself - or that you are a failure or have let yourself or your family down? (P) 0-->not at all   Trouble concentrating on things, such as reading the newspaper or watching television? (P) 0-->not at all   Moving or speaking so slowly that other people could have noticed? Or the opposite - being so fidgety or restless that you have been moving around a lot more than usual? (P) 0-->not at all   Thoughts that you would be better off dead, or of hurting yourself in some  way? (P) 0-->not at all   PHQ-9 Total Score (P) 1   If you checked off any problems, how difficult have these problems made it for you to do your work, take care of things at home, or get along with other people? (P) not difficult at all        Assessment/Plan:  Diagnoses and all orders for this visit:    1. Anxiety (Primary)  -     FLUoxetine (PROzac) 20 MG capsule; Take 1 capsule by mouth Daily.  Dispense: 30 capsule; Refill: 3    2. Psychophysiological insomnia      Patient states she feels anxiety is under good control with the Prozac.  Not needing to take trazodone.  We will continue medication as ordered and follow-up in 3 months.    A psychological evaluation was conducted in order to assess past and current level of functioning. Areas assessed included, but were not limited to: perception of social support, perception of ability to face and deal with challenges in life (positive functioning), anxiety symptoms, depressive symptoms, perspective on beliefs/belief system, coping skills for stress, intelligence level,  Therapeutic rapport was established. Interventions conducted today were geared towards incorporating medication management along with support for continued therapy. Education was also provided as to the med management with this provider and what to expect in subsequent sessions.    We discussed risks, benefits,goals and side effects of the above medication and the patient was agreeable with the plan.Patient was educated on the importance of compliance with treatment and follow-up appointments. Patient is aware to contact the [facility] [phone] with any worsening of symptoms. To call for questions or concerns and return early if necessary. Patent is agreeable to go to the Emergency Department or call 911 should they begin SI/HI.     Part of this note may be an electronic transcription/translation of spoken language to printed text using the Dragon Dictation  System.    Return in about 3 months (around 8/28/2024) for Follow Up 30 min, Video visit.    Leslye Camacho, APRN

## 2024-08-12 DIAGNOSIS — F41.9 ANXIETY: ICD-10-CM

## 2024-08-12 RX ORDER — FLUOXETINE HYDROCHLORIDE 20 MG/1
20 CAPSULE ORAL DAILY
Qty: 30 CAPSULE | Refills: 0 | Status: SHIPPED | OUTPATIENT
Start: 2024-08-12

## 2024-08-27 ENCOUNTER — TELEMEDICINE (OUTPATIENT)
Dept: PSYCHIATRY | Facility: CLINIC | Age: 24
End: 2024-08-27

## 2024-08-27 DIAGNOSIS — F51.04 PSYCHOPHYSIOLOGICAL INSOMNIA: Primary | ICD-10-CM

## 2024-08-27 DIAGNOSIS — F41.9 ANXIETY: ICD-10-CM

## 2024-08-27 NOTE — PROGRESS NOTES
Patient Name: Wendi Bailey  MRN: [id number]   :  2000     This provider is located at her home office through the [facility] (through [facility]), [address], [address] using a secure [employer] Video Visit through [employer]. Patient is being seen remotely via telehealth at their home address in [place], and stated they are in a secure environment for this session. The patient's condition being diagnosed/treated is appropriate for telemedicine. The provider identified herself as well as her credentials.   The patient, and/or patients guardian, consent to be seen remotely, and when consent is given they understand that the consent allows for patient identifiable information to be sent to a third party as needed.   They may refuse to be seen remotely at any time. The electronic data is encrypted and password protected, and the patient and/or guardian has been advised of the potential risks to privacy not withstanding such measures.    You have chosen to receive care through a telehealth visit.  Do you consent to use a video/audio connection for your medical care today? Yes    Chief Complaint:      ICD-10-CM ICD-9-CM   1. Psychophysiological insomnia  F51.04 307.42   2. Anxiety  F41.9 300.00       History of Present Illness: Wendi Bailey is a 24 y.o. female is here today for medication management follow up.  Patient feels anxiety is well managed with Prozac.  Feels has been very consistent.  Not having issues with insomnia.    The following portions of the patient's history were reviewed and updated as appropriate: allergies, current medications, past family history, past medical history, past social history, past surgical history, and problem list.    Review of Systems;;  Review of Systems   Constitutional:  Negative for activity change, appetite change, fatigue, unexpected weight gain and unexpected weight loss.   Respiratory:  Negative for  shortness of breath and wheezing.    Gastrointestinal:  Negative for constipation, diarrhea, nausea and vomiting.   Musculoskeletal:  Negative for gait problem.   Skin:  Negative for dry skin and rash.   Neurological:  Negative for dizziness, speech difficulty, weakness, light-headedness, headache, memory problem and confusion.   Psychiatric/Behavioral:  Negative for agitation, behavioral problems, decreased concentration, dysphoric mood, hallucinations, self-injury, sleep disturbance, suicidal ideas, negative for hyperactivity, depressed mood and stress. The patient is not nervous/anxious.        Physical Exam;;  Physical Exam  Constitutional:       General: She is not in acute distress.     Appearance: She is well-developed. She is not diaphoretic.   HENT:      Head: Normocephalic and atraumatic.   Eyes:      Conjunctiva/sclera: Conjunctivae normal.   Pulmonary:      Effort: Pulmonary effort is normal. No respiratory distress.   Musculoskeletal:         General: Normal range of motion.      Cervical back: Full passive range of motion without pain and normal range of motion.   Neurological:      Mental Status: She is alert and oriented to person, place, and time.   Psychiatric:         Mood and Affect: Mood is not anxious or depressed. Affect is not labile, blunt, angry or inappropriate.         Speech: Speech is not rapid and pressured or tangential.         Behavior: Behavior normal. Behavior is not agitated, slowed, aggressive, withdrawn, hyperactive or combative. Behavior is cooperative.         Thought Content: Thought content normal. Thought content is not paranoid or delusional. Thought content does not include homicidal or suicidal ideation. Thought content does not include homicidal or suicidal plan.         Judgment: Judgment normal.       There were no vitals taken for this visit.  There is no height or weight on file to calculate BMI. Video appt unable to obtain.     Current Medications;;    Current  Outpatient Medications:     FLUoxetine (PROzac) 20 MG capsule, Take 1 capsule by mouth Daily., Disp: 90 capsule, Rfl: 1    albuterol (PROVENTIL) (2.5 MG/3ML) 0.083% nebulizer solution, ALBUTEROL SULFATE (2.5 MG/3ML) 0.083% NEBU, Disp: , Rfl:     albuterol sulfate  (90 Base) MCG/ACT inhaler, Inhale 2 puffs Every 4 (Four) Hours As Needed for Wheezing., Disp: , Rfl:     Lab Results:   No visits with results within 3 Month(s) from this visit.   Latest known visit with results is:   Lab on 11/13/2019   Component Date Value Ref Range Status    Glucose 11/13/2019 103 (H)  65 - 99 mg/dL Final    BUN 11/13/2019 16  6 - 20 mg/dL Final    Creatinine 11/13/2019 0.68  0.57 - 1.00 mg/dL Final    Sodium 11/13/2019 141  136 - 145 mmol/L Final    Potassium 11/13/2019 3.9  3.5 - 5.2 mmol/L Final    Chloride 11/13/2019 105  98 - 107 mmol/L Final    CO2 11/13/2019 26.0  22.0 - 29.0 mmol/L Final    Calcium 11/13/2019 9.0  8.6 - 10.5 mg/dL Final    eGFR Non  Amer 11/13/2019 109  >60 mL/min/1.73 Final    BUN/Creatinine Ratio 11/13/2019 23.5  7.0 - 25.0 Final    Anion Gap 11/13/2019 10.0  5.0 - 15.0 mmol/L Final    HCG, Urine QL 11/13/2019 Negative  Negative Final    WBC 11/13/2019 7.86  3.40 - 10.80 10*3/mm3 Final    RBC 11/13/2019 4.66  3.77 - 5.28 10*6/mm3 Final    Hemoglobin 11/13/2019 13.7  12.0 - 15.9 g/dL Final    Hematocrit 11/13/2019 40.4  34.0 - 46.6 % Final    MCV 11/13/2019 86.7  79.0 - 97.0 fL Final    MCH 11/13/2019 29.4  26.6 - 33.0 pg Final    MCHC 11/13/2019 33.9  31.5 - 35.7 g/dL Final    RDW 11/13/2019 12.4  12.3 - 15.4 % Final    RDW-SD 11/13/2019 38.9  37.0 - 54.0 fl Final    MPV 11/13/2019 10.2  6.0 - 12.0 fL Final    Platelets 11/13/2019 387  140 - 450 10*3/mm3 Final    Neutrophil % 11/13/2019 58.2  42.7 - 76.0 % Final    Lymphocyte % 11/13/2019 30.5  19.6 - 45.3 % Final    Monocyte % 11/13/2019 4.6 (L)  5.0 - 12.0 % Final    Eosinophil % 11/13/2019 6.0  0.3 - 6.2 % Final    Basophil % 11/13/2019 0.6   0.0 - 1.5 % Final    Immature Grans % 11/13/2019 0.1  0.0 - 0.5 % Final    Neutrophils, Absolute 11/13/2019 4.57  1.70 - 7.00 10*3/mm3 Final    Lymphocytes, Absolute 11/13/2019 2.40  0.70 - 3.10 10*3/mm3 Final    Monocytes, Absolute 11/13/2019 0.36  0.10 - 0.90 10*3/mm3 Final    Eosinophils, Absolute 11/13/2019 0.47 (H)  0.00 - 0.40 10*3/mm3 Final    Basophils, Absolute 11/13/2019 0.05  0.00 - 0.20 10*3/mm3 Final    Immature Grans, Absolute 11/13/2019 0.01  0.00 - 0.05 10*3/mm3 Final    nRBC 11/13/2019 0.0  0.0 - 0.2 /100 WBC Final       Mental Status Exam:   Hygiene:   good  Cooperation:  Cooperative  Eye Contact:  Good  Psychomotor Behavior:  Appropriate  Mood:within normal limits  Affect:  Appropriate  Hopelessness: Denies  Speech:  Normal  Thought Process:  Goal directed  Thought Content:  Normal  Suicidal:  None  Homicidal:  None  Hallucinations:  None  Delusion:  None  Memory:  Intact  Orientation:  Person, Place, Time, and Situation  Reliability:  good  Insight:  Good  Judgement:  Good  Impulse Control:  Good    PHQ-9 Depression Screening  Little interest or pleasure in doing things? (P) 1-->several days   Feeling down, depressed, or hopeless? (P) 0-->not at all   Trouble falling or staying asleep, or sleeping too much? (P) 0-->not at all   Feeling tired or having little energy? (P) 1-->several days   Poor appetite or overeating? (P) 0-->not at all   Feeling bad about yourself - or that you are a failure or have let yourself or your family down? (P) 0-->not at all   Trouble concentrating on things, such as reading the newspaper or watching television? (P) 0-->not at all   Moving or speaking so slowly that other people could have noticed? Or the opposite - being so fidgety or restless that you have been moving around a lot more than usual? (P) 0-->not at all   Thoughts that you would be better off dead, or of hurting yourself in some way? (P) 0-->not at all   PHQ-9 Total Score (P) 2   If you checked off any  problems, how difficult have these problems made it for you to do your work, take care of things at home, or get along with other people? (P) not difficult at all        Assessment/Plan:  Diagnoses and all orders for this visit:    1. Psychophysiological insomnia (Primary)    2. Anxiety  -     FLUoxetine (PROzac) 20 MG capsule; Take 1 capsule by mouth Daily.  Dispense: 90 capsule; Refill: 1      Patient feels anxiety is well managed with Prozac.  Would like to continue as ordered and follow-up in 6 months.    A psychological evaluation was conducted in order to assess past and current level of functioning. Areas assessed included, but were not limited to: perception of social support, perception of ability to face and deal with challenges in life (positive functioning), anxiety symptoms, depressive symptoms, perspective on beliefs/belief system, coping skills for stress, intelligence level,  Therapeutic rapport was established. Interventions conducted today were geared towards incorporating medication management along with support for continued therapy. Education was also provided as to the med management with this provider and what to expect in subsequent sessions.    We discussed risks, benefits,goals and side effects of the above medication and the patient was agreeable with the plan.Patient was educated on the importance of compliance with treatment and follow-up appointments. Patient is aware to contact the [facility] [phone] with any worsening of symptoms. To call for questions or concerns and return early if necessary. Patent is agreeable to go to the Emergency Department or call 911 should they begin SI/HI.     Part of this note may be an electronic transcription/translation of spoken language to printed text using the Dragon Dictation System.    No follow-ups on file.    Leslye Camacho, SONIA

## 2024-12-02 ENCOUNTER — TELEPHONE (OUTPATIENT)
Dept: PSYCHIATRY | Facility: CLINIC | Age: 24
End: 2024-12-02

## 2024-12-02 DIAGNOSIS — F41.9 ANXIETY: ICD-10-CM

## 2024-12-02 RX ORDER — FLUOXETINE 40 MG/1
40 CAPSULE ORAL DAILY
Qty: 90 CAPSULE | Refills: 1 | Status: SHIPPED | OUTPATIENT
Start: 2024-12-02

## 2024-12-02 NOTE — TELEPHONE ENCOUNTER
Pt called request increase on the Prozac from 20 mg to 40 mg.Pt stated this was discuss with provider.Provider out office.Please advise

## 2024-12-02 NOTE — TELEPHONE ENCOUNTER
Spoke with patient at 1610. She states that she was previously on 40 mg, then over the summer, her and her provider decided to trial a lower dosage of Prozac. She states that her and her provider talked about potentially increasing that back up if symptoms worsened.  Over the holidays, her stress levels have increased, and she feels its time to increase. She denies any other new concerning symptoms that have not been discussed with her previous provider. Will plan to send for Prozac 40 mg and recommend she keep follow up as scheduled with her provider. If she finds that symptoms are not improved with the increased dosage, she was instructed to call the clinic back and reschedule for an earlier appointment.

## 2025-02-25 ENCOUNTER — TELEMEDICINE (OUTPATIENT)
Dept: PSYCHIATRY | Facility: CLINIC | Age: 25
End: 2025-02-25

## 2025-02-25 DIAGNOSIS — F41.9 ANXIETY: Primary | ICD-10-CM

## 2025-02-25 DIAGNOSIS — F51.04 PSYCHOPHYSIOLOGICAL INSOMNIA: ICD-10-CM

## 2025-02-25 RX ORDER — FLUOXETINE HYDROCHLORIDE 40 MG/1
40 CAPSULE ORAL DAILY
Qty: 90 CAPSULE | Refills: 1 | Status: SHIPPED | OUTPATIENT
Start: 2025-02-25

## 2025-02-25 RX ORDER — TRAZODONE HYDROCHLORIDE 50 MG/1
50 TABLET ORAL NIGHTLY
Qty: 90 TABLET | Refills: 1 | Status: SHIPPED | OUTPATIENT
Start: 2025-02-25

## 2025-02-25 NOTE — PROGRESS NOTES
Patient Name: Wendi Bailey  MRN: [id number]   :  2000     This provider is located at her home office through the [facility] (through [facility]), [address], [address] using a secure [employer] Video Visit through [employer]. Patient is being seen remotely via telehealth at their home address in [place], and stated they are in a secure environment for this session. The patient's condition being diagnosed/treated is appropriate for telemedicine. The provider identified herself as well as her credentials.   The patient, and/or patients guardian, consent to be seen remotely, and when consent is given they understand that the consent allows for patient identifiable information to be sent to a third party as needed.   They may refuse to be seen remotely at any time. The electronic data is encrypted and password protected, and the patient and/or guardian has been advised of the potential risks to privacy not withstanding such measures.    You have chosen to receive care through a telehealth visit.  Do you consent to use a video/audio connection for your medical care today? Yes    Chief Complaint:      ICD-10-CM ICD-9-CM   1. Anxiety  F41.9 300.00   2. Psychophysiological insomnia  F51.04 307.42       History of Present Illness: Wendi Bailey is a 24 y.o. female is here today for medication management follow up.  Patient states going back to Prozac 40 mg was very beneficial.  States she was really down.  Is now seeing a therapist.  Would like to continue medication as it is currently ordered.    The following portions of the patient's history were reviewed and updated as appropriate: allergies, current medications, past family history, past medical history, past social history, past surgical history, and problem list.    Review of Systems;;  Review of Systems   Constitutional:  Negative for activity change, appetite change, fatigue, unexpected weight  gain and unexpected weight loss.   Respiratory:  Negative for shortness of breath and wheezing.    Gastrointestinal:  Negative for constipation, diarrhea, nausea and vomiting.   Musculoskeletal:  Negative for gait problem.   Skin:  Negative for dry skin and rash.   Neurological:  Negative for dizziness, speech difficulty, weakness, light-headedness, headache, memory problem and confusion.   Psychiatric/Behavioral:  Negative for agitation, behavioral problems, decreased concentration, dysphoric mood, hallucinations, self-injury, sleep disturbance, suicidal ideas, negative for hyperactivity, depressed mood and stress. The patient is not nervous/anxious.        Physical Exam;;  Physical Exam  Constitutional:       General: She is not in acute distress.     Appearance: She is well-developed. She is not diaphoretic.   HENT:      Head: Normocephalic and atraumatic.   Eyes:      Conjunctiva/sclera: Conjunctivae normal.   Pulmonary:      Effort: Pulmonary effort is normal. No respiratory distress.   Musculoskeletal:         General: Normal range of motion.      Cervical back: Full passive range of motion without pain and normal range of motion.   Neurological:      Mental Status: She is alert and oriented to person, place, and time.   Psychiatric:         Mood and Affect: Mood is not anxious or depressed. Affect is not labile, blunt, angry or inappropriate.         Speech: Speech is not rapid and pressured or tangential.         Behavior: Behavior normal. Behavior is not agitated, slowed, aggressive, withdrawn, hyperactive or combative. Behavior is cooperative.         Thought Content: Thought content normal. Thought content is not paranoid or delusional. Thought content does not include homicidal or suicidal ideation. Thought content does not include homicidal or suicidal plan.         Judgment: Judgment normal.       There were no vitals taken for this visit.  There is no height or weight on file to calculate BMI. Video  appt unable to obtain.     Current Medications;;    Current Outpatient Medications:     FLUoxetine (PROzac) 40 MG capsule, Take 1 capsule by mouth Daily., Disp: 90 capsule, Rfl: 1    albuterol (PROVENTIL) (2.5 MG/3ML) 0.083% nebulizer solution, ALBUTEROL SULFATE (2.5 MG/3ML) 0.083% NEBU, Disp: , Rfl:     albuterol sulfate  (90 Base) MCG/ACT inhaler, Inhale 2 puffs Every 4 (Four) Hours As Needed for Wheezing., Disp: , Rfl:     traZODone (DESYREL) 50 MG tablet, Take 1 tablet by mouth Every Night., Disp: 90 tablet, Rfl: 1    Lab Results:   No visits with results within 3 Month(s) from this visit.   Latest known visit with results is:   Lab on 11/13/2019   Component Date Value Ref Range Status    Glucose 11/13/2019 103 (H)  65 - 99 mg/dL Final    BUN 11/13/2019 16  6 - 20 mg/dL Final    Creatinine 11/13/2019 0.68  0.57 - 1.00 mg/dL Final    Sodium 11/13/2019 141  136 - 145 mmol/L Final    Potassium 11/13/2019 3.9  3.5 - 5.2 mmol/L Final    Chloride 11/13/2019 105  98 - 107 mmol/L Final    CO2 11/13/2019 26.0  22.0 - 29.0 mmol/L Final    Calcium 11/13/2019 9.0  8.6 - 10.5 mg/dL Final    eGFR Non  Amer 11/13/2019 109  >60 mL/min/1.73 Final    BUN/Creatinine Ratio 11/13/2019 23.5  7.0 - 25.0 Final    Anion Gap 11/13/2019 10.0  5.0 - 15.0 mmol/L Final    HCG, Urine QL 11/13/2019 Negative  Negative Final    WBC 11/13/2019 7.86  3.40 - 10.80 10*3/mm3 Final    RBC 11/13/2019 4.66  3.77 - 5.28 10*6/mm3 Final    Hemoglobin 11/13/2019 13.7  12.0 - 15.9 g/dL Final    Hematocrit 11/13/2019 40.4  34.0 - 46.6 % Final    MCV 11/13/2019 86.7  79.0 - 97.0 fL Final    MCH 11/13/2019 29.4  26.6 - 33.0 pg Final    MCHC 11/13/2019 33.9  31.5 - 35.7 g/dL Final    RDW 11/13/2019 12.4  12.3 - 15.4 % Final    RDW-SD 11/13/2019 38.9  37.0 - 54.0 fl Final    MPV 11/13/2019 10.2  6.0 - 12.0 fL Final    Platelets 11/13/2019 387  140 - 450 10*3/mm3 Final    Neutrophil % 11/13/2019 58.2  42.7 - 76.0 % Final    Lymphocyte % 11/13/2019  30.5  19.6 - 45.3 % Final    Monocyte % 11/13/2019 4.6 (L)  5.0 - 12.0 % Final    Eosinophil % 11/13/2019 6.0  0.3 - 6.2 % Final    Basophil % 11/13/2019 0.6  0.0 - 1.5 % Final    Immature Grans % 11/13/2019 0.1  0.0 - 0.5 % Final    Neutrophils, Absolute 11/13/2019 4.57  1.70 - 7.00 10*3/mm3 Final    Lymphocytes, Absolute 11/13/2019 2.40  0.70 - 3.10 10*3/mm3 Final    Monocytes, Absolute 11/13/2019 0.36  0.10 - 0.90 10*3/mm3 Final    Eosinophils, Absolute 11/13/2019 0.47 (H)  0.00 - 0.40 10*3/mm3 Final    Basophils, Absolute 11/13/2019 0.05  0.00 - 0.20 10*3/mm3 Final    Immature Grans, Absolute 11/13/2019 0.01  0.00 - 0.05 10*3/mm3 Final    nRBC 11/13/2019 0.0  0.0 - 0.2 /100 WBC Final       Mental Status Exam:   Hygiene:   good  Cooperation:  Cooperative  Eye Contact:  Good  Psychomotor Behavior:  Appropriate  Mood:within normal limits  Affect:  Appropriate  Hopelessness: Denies  Speech:  Normal  Thought Process:  Goal directed  Thought Content:  Normal  Suicidal:  None  Homicidal:  None  Hallucinations:  None  Delusion:  None  Memory:  Intact  Orientation:  Person, Place, Time, and Situation  Reliability:  good  Insight:  Good  Judgement:  Good  Impulse Control:  Good    PHQ-9 Depression Screening  Little interest or pleasure in doing things? (Patient-Rptd) Not at all   Feeling down, depressed, or hopeless? (Patient-Rptd) Not at all   PHQ-2 Total Score (Patient-Rptd) 0   Trouble falling or staying asleep, or sleeping too much? (Patient-Rptd) Several days   Feeling tired or having little energy? (Patient-Rptd) Several days   Poor appetite or overeating? (Patient-Rptd) Not at all   Feeling bad about yourself - or that you are a failure or have let yourself or your family down? (Patient-Rptd) Not at all   Trouble concentrating on things, such as reading the newspaper or watching television? (Patient-Rptd) Several days   Moving or speaking so slowly that other people could have noticed? Or the opposite - being so  fidgety or restless that you have been moving around a lot more than usual? (Patient-Rptd) Not at all   Thoughts that you would be better off dead, or of hurting yourself in some way? (Patient-Rptd) Not at all   PHQ-9 Total Score (Patient-Rptd) 3   If you checked off any problems, how difficult have these problems made it for you to do your work, take care of things at home, or get along with other people? (Patient-Rptd) Not difficult at all         Assessment/Plan:  Diagnoses and all orders for this visit:    1. Anxiety (Primary)  -     FLUoxetine (PROzac) 40 MG capsule; Take 1 capsule by mouth Daily.  Dispense: 90 capsule; Refill: 1    2. Psychophysiological insomnia  -     traZODone (DESYREL) 50 MG tablet; Take 1 tablet by mouth Every Night.  Dispense: 90 tablet; Refill: 1    Patient states she has been doing much better with the increase back to 40 mg of Prozac.  States she was really down.  We will continue medication as ordered and follow-up in 6 months.      A psychological evaluation was conducted in order to assess past and current level of functioning. Areas assessed included, but were not limited to: perception of social support, perception of ability to face and deal with challenges in life (positive functioning), anxiety symptoms, depressive symptoms, perspective on beliefs/belief system, coping skills for stress, intelligence level,  Therapeutic rapport was established. Interventions conducted today were geared towards incorporating medication management along with support for continued therapy. Education was also provided as to the med management with this provider and what to expect in subsequent sessions.    We discussed risks, benefits,goals and side effects of the above medication and the patient was agreeable with the plan.Patient was educated on the importance of compliance with treatment and follow-up appointments. Patient is aware to contact the [facility]  [phone] with any worsening of symptoms. To call for questions or concerns and return early if necessary. Patent is agreeable to go to the Emergency Department or call 911 should they begin SI/HI.     Part of this note may be an electronic transcription/translation of spoken language to printed text using the Dragon Dictation System.    Return in about 6 months (around 8/25/2025) for Follow Up 30 min, Video visit.    Leslye Camacho, APRN